# Patient Record
Sex: FEMALE | Race: AMERICAN INDIAN OR ALASKA NATIVE | NOT HISPANIC OR LATINO | Employment: UNEMPLOYED | URBAN - METROPOLITAN AREA
[De-identification: names, ages, dates, MRNs, and addresses within clinical notes are randomized per-mention and may not be internally consistent; named-entity substitution may affect disease eponyms.]

---

## 2021-05-24 ENCOUNTER — RECORDS - HEALTHEAST (OUTPATIENT)
Dept: ADMINISTRATIVE | Facility: CLINIC | Age: 41
End: 2021-05-24

## 2021-05-25 ENCOUNTER — RECORDS - HEALTHEAST (OUTPATIENT)
Dept: ADMINISTRATIVE | Facility: CLINIC | Age: 41
End: 2021-05-25

## 2022-04-03 ENCOUNTER — HOSPITAL ENCOUNTER (EMERGENCY)
Facility: HOSPITAL | Age: 42
Discharge: HOME | End: 2022-04-03
Attending: EMERGENCY MEDICINE | Admitting: EMERGENCY MEDICINE
Payer: COMMERCIAL

## 2022-04-03 VITALS
OXYGEN SATURATION: 100 % | SYSTOLIC BLOOD PRESSURE: 173 MMHG | DIASTOLIC BLOOD PRESSURE: 77 MMHG | TEMPERATURE: 97.7 F | WEIGHT: 135 LBS | HEART RATE: 70 BPM | RESPIRATION RATE: 14 BRPM

## 2022-04-03 DIAGNOSIS — K08.89 PAIN, DENTAL: ICD-10-CM

## 2022-04-03 PROCEDURE — 250N000013 HC RX MED GY IP 250 OP 250 PS 637: Performed by: EMERGENCY MEDICINE

## 2022-04-03 PROCEDURE — 99283 EMERGENCY DEPT VISIT LOW MDM: CPT

## 2022-04-03 RX ORDER — PENICILLIN V POTASSIUM 500 MG/1
500 TABLET, FILM COATED ORAL 4 TIMES DAILY
Qty: 40 TABLET | Refills: 0 | Status: SHIPPED | OUTPATIENT
Start: 2022-04-03 | End: 2022-04-13

## 2022-04-03 RX ORDER — PENICILLIN V POTASSIUM 250 MG/1
500 TABLET, FILM COATED ORAL ONCE
Status: COMPLETED | OUTPATIENT
Start: 2022-04-03 | End: 2022-04-03

## 2022-04-03 RX ORDER — IBUPROFEN 800 MG/1
800 TABLET, FILM COATED ORAL EVERY 8 HOURS PRN
Qty: 24 TABLET | Refills: 0 | Status: SHIPPED | OUTPATIENT
Start: 2022-04-03 | End: 2022-04-11

## 2022-04-03 RX ORDER — IBUPROFEN 600 MG/1
600 TABLET, FILM COATED ORAL ONCE
Status: COMPLETED | OUTPATIENT
Start: 2022-04-03 | End: 2022-04-03

## 2022-04-03 RX ADMIN — PENICILLIN V POTASSIUM 500 MG: 250 TABLET, FILM COATED ORAL at 05:58

## 2022-04-03 RX ADMIN — IBUPROFEN 600 MG: 600 TABLET ORAL at 05:58

## 2022-04-03 NOTE — ED TRIAGE NOTES
Patient presents here for evaluation of oral pain that has occurred over the past three weeks, following partial removal of a molar on the left lower row. She also notes that she has a pimple or a sore on her left cheek that she is picking at, which is very uncomfortable.

## 2022-04-03 NOTE — DISCHARGE INSTRUCTIONS
Our primary care medical scheduling team will call you to help you set up an appointment for a blood pressure recheck, because your blood pressure in the ER tonight was high and you should have it rechecked by our primary medical care team in their office.

## 2022-04-03 NOTE — ED PROVIDER NOTES
EMERGENCY DEPARTMENT ENCOUNTER      NAME: Jennifer Batres  AGE: 41 year old female  YOB: 1980  MRN: [id number]  EVALUATION DATE & TIME: 4/3/2022  4:45 AM    PCP: No primary care provider on file.    ED PROVIDER: Diane Guerra M.D.      Chief Complaint   Patient presents with     Dental Pain         FINAL IMPRESSION:  1. Pain, dental          ED COURSE & MEDICAL DECISION MAKING:    ED Course as of 04/03/22 0624   Sun Apr 03, 2022   0548 Pt with many older appearing dental caries without local abscess or trismus or facial swelling, normal VS and afebrile. Pt amenable to pen VK, ibuprofen and given dental close f/u and referral to PMD for BP recheck as BP was elevated in the ED today. Patient discharged after being provided with extensive anticipatory guidance and given return precautions, importance of PMD follow-up emphasized.        Pertinent Labs & Imaging studies reviewed. (See chart for details)    N95 worn  A face shield was worn also  COVID PPE    5:44 AM Introduced myself to the patient, obtained history of present illness, and performed initial physical exam at this time.   5:49 AM Discussed discharge at this time. The patient is agreeable with this plan.    At the conclusion of the encounter I discussed the results of all of the tests and the disposition. The questions were answered. The patient or family acknowledged understanding and was agreeable with the care plan.     MEDICATIONS GIVEN IN THE EMERGENCY:  Medications   ibuprofen (ADVIL/MOTRIN) tablet 600 mg (600 mg Oral Given 4/3/22 0558)   penicillin V (VEETID) tablet 500 mg (500 mg Oral Given 4/3/22 0558)       NEW PRESCRIPTIONS STARTED AT TODAY'S ER VISIT  Discharge Medication List as of 4/3/2022  5:59 AM      START taking these medications    Details   ibuprofen (ADVIL/MOTRIN) 800 MG tablet Take 1 tablet (800 mg) by mouth every 8 hours as needed for moderate pain, Disp-24 tablet, R-0, E-Prescribe      penicillin V (VEETID) 500 MG  tablet Take 1 tablet (500 mg) by mouth 4 times daily for 10 days, Disp-40 tablet, R-0, E-Prescribe                =================================================================    HPI    Jennifer Batres is a 41 year old female who presents to the ED today via private vehicle with dental pain.     About 6 months ago, the patient had a broken tooth and was able to remove half of it. This morning, her whole jaw has been bothering her, as it feels like there is a lot of pressure on her jaw. She rates the pain 7/10. She endorses chills, but denies fever. She has not taken any medication yet today.     REVIEW OF SYSTEMS   All other systems reviewed and are negative except as noted above in HPI.    PAST MEDICAL HISTORY:  No past medical history on file.    PAST SURGICAL HISTORY:  No past surgical history on file.    CURRENT MEDICATIONS:    ibuprofen (ADVIL/MOTRIN) 800 MG tablet  penicillin V (VEETID) 500 MG tablet        ALLERGIES:  No Known Allergies    FAMILY HISTORY:  No family history on file.    SOCIAL HISTORY:   Social History     Socioeconomic History     Marital status: Single     Spouse name: Not on file     Number of children: Not on file     Years of education: Not on file     Highest education level: Not on file   Occupational History     Not on file   Tobacco Use     Smoking status: Not on file     Smokeless tobacco: Not on file   Substance and Sexual Activity     Alcohol use: Not on file     Drug use: Not on file     Sexual activity: Not on file   Other Topics Concern     Not on file   Social History Narrative     Not on file     Social Determinants of Health     Financial Resource Strain: Not on file   Food Insecurity: Not on file   Transportation Needs: Not on file   Physical Activity: Not on file   Stress: Not on file   Social Connections: Not on file   Intimate Partner Violence: Not on file   Housing Stability: Not on file       VITALS:  Patient Vitals for the past 24 hrs:   BP Temp Temp src Pulse  Resp SpO2 Weight   04/03/22 0440 (!) 173/77 97.7  F (36.5  C) Oral 70 14 100 % 61.2 kg (135 lb)       PHYSICAL EXAM    GENERAL: Awake, alert.  In no acute distress.   HEENT: Normocephalic, atraumatic.  Pupils equal, round and reactive.  Conjunctiva normal.  EOMI. Left mandibular molar x2 partially missing without trismus, abscess, or local inflammation.  NECK: No stridor or apparent deformity.  EXTREMITIES: No lower extremity swelling or edema.    NEURO: Alert and oriented to person, place and time.  Cranial nerves grossly intact.  No focal motor deficit.  PSYCH: Normal mood and affect  SKIN: No rashes            I, Aysha Zuh, am serving as a scribe to document services personally performed by Dr. Diane Guerra based on my observation and the provider's statements to me. IDiane MD attest that Aysha Zhu is acting in a scribe capacity, has observed my performance of the services and has documented them in accordance with my direction.     Diane Guerra MD  04/03/22 0651

## 2023-08-28 ENCOUNTER — HOSPITAL ENCOUNTER (OUTPATIENT)
Facility: CLINIC | Age: 43
Setting detail: OBSERVATION
Discharge: HOME | End: 2023-08-29
Attending: EMERGENCY MEDICINE | Admitting: EMERGENCY MEDICINE
Payer: COMMERCIAL

## 2023-08-28 DIAGNOSIS — R11.2 NAUSEA AND VOMITING, UNSPECIFIED VOMITING TYPE: ICD-10-CM

## 2023-08-28 DIAGNOSIS — N12 PYELONEPHRITIS: ICD-10-CM

## 2023-08-28 PROCEDURE — 96365 THER/PROPH/DIAG IV INF INIT: CPT | Mod: 59

## 2023-08-28 PROCEDURE — 96361 HYDRATE IV INFUSION ADD-ON: CPT

## 2023-08-28 PROCEDURE — 99285 EMERGENCY DEPT VISIT HI MDM: CPT | Mod: 25

## 2023-08-28 RX ORDER — ONDANSETRON 2 MG/ML
4 INJECTION INTRAMUSCULAR; INTRAVENOUS ONCE
Status: COMPLETED | OUTPATIENT
Start: 2023-08-29 | End: 2023-08-29

## 2023-08-28 RX ORDER — KETOROLAC TROMETHAMINE 15 MG/ML
15 INJECTION, SOLUTION INTRAMUSCULAR; INTRAVENOUS ONCE
Status: COMPLETED | OUTPATIENT
Start: 2023-08-29 | End: 2023-08-29

## 2023-08-29 ENCOUNTER — APPOINTMENT (OUTPATIENT)
Dept: CT IMAGING | Facility: CLINIC | Age: 43
End: 2023-08-29
Attending: EMERGENCY MEDICINE
Payer: COMMERCIAL

## 2023-08-29 VITALS
OXYGEN SATURATION: 100 % | DIASTOLIC BLOOD PRESSURE: 95 MMHG | BODY MASS INDEX: 19.26 KG/M2 | TEMPERATURE: 98.8 F | HEART RATE: 61 BPM | WEIGHT: 130 LBS | SYSTOLIC BLOOD PRESSURE: 157 MMHG | HEIGHT: 69 IN | RESPIRATION RATE: 16 BRPM

## 2023-08-29 PROBLEM — N12 PYELONEPHRITIS: Status: ACTIVE | Noted: 2023-08-29

## 2023-08-29 PROBLEM — F19.10 POLYSUBSTANCE ABUSE (H): Status: ACTIVE | Noted: 2023-08-29

## 2023-08-29 PROBLEM — F15.20 METHAMPHETAMINE USE DISORDER, SEVERE, DEPENDENCE (H): Status: ACTIVE | Noted: 2021-11-01

## 2023-08-29 PROBLEM — F11.90 OPIOID USE DISORDER: Status: ACTIVE | Noted: 2023-08-29

## 2023-08-29 PROBLEM — F12.20 TETRAHYDROCANNABINOL (THC) USE DISORDER, SEVERE, DEPENDENCE (H): Status: ACTIVE | Noted: 2021-11-01

## 2023-08-29 PROBLEM — Z86.73 HISTORY OF CEREBROVASCULAR ACCIDENT (CVA) DUE TO ISCHEMIA: Status: ACTIVE | Noted: 2023-08-29

## 2023-08-29 PROBLEM — T40.2X1A OPIOID OVERDOSE (H): Status: ACTIVE | Noted: 2021-11-01

## 2023-08-29 PROBLEM — R11.2 NAUSEA AND VOMITING, UNSPECIFIED VOMITING TYPE: Status: ACTIVE | Noted: 2023-08-29

## 2023-08-29 PROBLEM — Z22.322 MRSA (METHICILLIN RESISTANT STAPHYLOCOCCUS AUREUS) CARRIER: Status: ACTIVE | Noted: 2023-08-29

## 2023-08-29 LAB
ALBUMIN SERPL-MCNC: 3.7 G/DL (ref 3.5–5)
ALBUMIN UR-MCNC: NEGATIVE MG/DL
ALP SERPL-CCNC: 74 U/L (ref 45–120)
ALT SERPL W P-5'-P-CCNC: 47 U/L (ref 0–45)
ANION GAP SERPL CALCULATED.3IONS-SCNC: 7 MMOL/L (ref 5–18)
APPEARANCE UR: CLEAR
AST SERPL W P-5'-P-CCNC: 60 U/L (ref 0–40)
BACTERIA #/AREA URNS HPF: ABNORMAL /HPF
BASOPHILS # BLD AUTO: 0 10E3/UL (ref 0–0.2)
BASOPHILS NFR BLD AUTO: 1 %
BILIRUB SERPL-MCNC: 0.9 MG/DL (ref 0–1)
BILIRUB UR QL STRIP: NEGATIVE
BUN SERPL-MCNC: 9 MG/DL (ref 8–22)
CALCIUM SERPL-MCNC: 8.7 MG/DL (ref 8.5–10.5)
CHLORIDE BLD-SCNC: 104 MMOL/L (ref 98–107)
CO2 SERPL-SCNC: 26 MMOL/L (ref 22–31)
COLOR UR AUTO: COLORLESS
CREAT SERPL-MCNC: 0.74 MG/DL (ref 0.6–1.1)
EOSINOPHIL # BLD AUTO: 0.2 10E3/UL (ref 0–0.7)
EOSINOPHIL NFR BLD AUTO: 3 %
ERYTHROCYTE [DISTWIDTH] IN BLOOD BY AUTOMATED COUNT: 17.9 % (ref 10–15)
GFR SERPL CREATININE-BSD FRML MDRD: >90 ML/MIN/1.73M2
GLUCOSE BLD-MCNC: 104 MG/DL (ref 70–125)
GLUCOSE UR STRIP-MCNC: NEGATIVE MG/DL
HCT VFR BLD AUTO: 30.9 % (ref 35–47)
HGB BLD-MCNC: 8.6 G/DL (ref 11.7–15.7)
HGB UR QL STRIP: NEGATIVE
IMM GRANULOCYTES # BLD: 0 10E3/UL
IMM GRANULOCYTES NFR BLD: 0 %
KETONES UR STRIP-MCNC: 10 MG/DL
LACTATE SERPL-SCNC: 0.7 MMOL/L (ref 0.7–2)
LEUKOCYTE ESTERASE UR QL STRIP: ABNORMAL
LYMPHOCYTES # BLD AUTO: 1.5 10E3/UL (ref 0.8–5.3)
LYMPHOCYTES NFR BLD AUTO: 22 %
MCH RBC QN AUTO: 18.3 PG (ref 26.5–33)
MCHC RBC AUTO-ENTMCNC: 27.8 G/DL (ref 31.5–36.5)
MCV RBC AUTO: 66 FL (ref 78–100)
MONOCYTES # BLD AUTO: 0.6 10E3/UL (ref 0–1.3)
MONOCYTES NFR BLD AUTO: 9 %
MRSA DNA SPEC QL NAA+PROBE: NEGATIVE
MUCOUS THREADS #/AREA URNS LPF: PRESENT /LPF
NEUTROPHILS # BLD AUTO: 4.3 10E3/UL (ref 1.6–8.3)
NEUTROPHILS NFR BLD AUTO: 65 %
NITRATE UR QL: POSITIVE
NRBC # BLD AUTO: 0 10E3/UL
NRBC BLD AUTO-RTO: 0 /100
PH UR STRIP: 7 [PH] (ref 5–7)
PLATELET # BLD AUTO: 351 10E3/UL (ref 150–450)
POTASSIUM BLD-SCNC: 4.1 MMOL/L (ref 3.5–5)
PROT SERPL-MCNC: 7.1 G/DL (ref 6–8)
RBC # BLD AUTO: 4.71 10E6/UL (ref 3.8–5.2)
RBC URINE: 1 /HPF
SA TARGET DNA: NEGATIVE
SODIUM SERPL-SCNC: 137 MMOL/L (ref 136–145)
SP GR UR STRIP: 1.01 (ref 1–1.03)
UROBILINOGEN UR STRIP-MCNC: <2 MG/DL
WBC # BLD AUTO: 6.6 10E3/UL (ref 4–11)
WBC URINE: 10 /HPF

## 2023-08-29 PROCEDURE — 250N000011 HC RX IP 250 OP 636: Performed by: EMERGENCY MEDICINE

## 2023-08-29 PROCEDURE — 96375 TX/PRO/DX INJ NEW DRUG ADDON: CPT

## 2023-08-29 PROCEDURE — 83605 ASSAY OF LACTIC ACID: CPT | Performed by: EMERGENCY MEDICINE

## 2023-08-29 PROCEDURE — 99223 1ST HOSP IP/OBS HIGH 75: CPT | Performed by: FAMILY MEDICINE

## 2023-08-29 PROCEDURE — 74177 CT ABD & PELVIS W/CONTRAST: CPT

## 2023-08-29 PROCEDURE — 96365 THER/PROPH/DIAG IV INF INIT: CPT | Mod: 59

## 2023-08-29 PROCEDURE — 81001 URINALYSIS AUTO W/SCOPE: CPT | Performed by: EMERGENCY MEDICINE

## 2023-08-29 PROCEDURE — 258N000003 HC RX IP 258 OP 636: Performed by: FAMILY MEDICINE

## 2023-08-29 PROCEDURE — 87086 URINE CULTURE/COLONY COUNT: CPT | Performed by: EMERGENCY MEDICINE

## 2023-08-29 PROCEDURE — 96361 HYDRATE IV INFUSION ADD-ON: CPT

## 2023-08-29 PROCEDURE — 87040 BLOOD CULTURE FOR BACTERIA: CPT | Performed by: EMERGENCY MEDICINE

## 2023-08-29 PROCEDURE — 250N000011 HC RX IP 250 OP 636: Mod: JZ | Performed by: EMERGENCY MEDICINE

## 2023-08-29 PROCEDURE — 80053 COMPREHEN METABOLIC PANEL: CPT | Performed by: EMERGENCY MEDICINE

## 2023-08-29 PROCEDURE — G0378 HOSPITAL OBSERVATION PER HR: HCPCS

## 2023-08-29 PROCEDURE — 85025 COMPLETE CBC W/AUTO DIFF WBC: CPT | Performed by: EMERGENCY MEDICINE

## 2023-08-29 PROCEDURE — 258N000003 HC RX IP 258 OP 636: Performed by: EMERGENCY MEDICINE

## 2023-08-29 PROCEDURE — 36415 COLL VENOUS BLD VENIPUNCTURE: CPT | Performed by: EMERGENCY MEDICINE

## 2023-08-29 PROCEDURE — 87641 MR-STAPH DNA AMP PROBE: CPT | Performed by: FAMILY MEDICINE

## 2023-08-29 RX ORDER — POLYETHYLENE GLYCOL 3350 17 G/17G
17 POWDER, FOR SOLUTION ORAL DAILY PRN
Status: DISCONTINUED | OUTPATIENT
Start: 2023-08-29 | End: 2023-08-29 | Stop reason: HOSPADM

## 2023-08-29 RX ORDER — CEFDINIR 300 MG/1
300 CAPSULE ORAL 2 TIMES DAILY
Qty: 28 CAPSULE | Refills: 0 | Status: SHIPPED | OUTPATIENT
Start: 2023-08-29 | End: 2023-09-12

## 2023-08-29 RX ORDER — SULFAMETHOXAZOLE/TRIMETHOPRIM 800-160 MG
2 TABLET ORAL 2 TIMES DAILY
COMMUNITY
Start: 2023-08-19 | End: 2023-08-29

## 2023-08-29 RX ORDER — SODIUM CHLORIDE 9 MG/ML
INJECTION, SOLUTION INTRAVENOUS CONTINUOUS
Status: DISCONTINUED | OUTPATIENT
Start: 2023-08-29 | End: 2023-08-29 | Stop reason: HOSPADM

## 2023-08-29 RX ORDER — CEFTRIAXONE 1 G/1
1 INJECTION, POWDER, FOR SOLUTION INTRAMUSCULAR; INTRAVENOUS ONCE
Status: COMPLETED | OUTPATIENT
Start: 2023-08-29 | End: 2023-08-29

## 2023-08-29 RX ORDER — ACETAMINOPHEN 325 MG/1
650 TABLET ORAL EVERY 6 HOURS PRN
Status: DISCONTINUED | OUTPATIENT
Start: 2023-08-29 | End: 2023-08-29 | Stop reason: HOSPADM

## 2023-08-29 RX ORDER — MORPHINE SULFATE 4 MG/ML
4 INJECTION, SOLUTION INTRAMUSCULAR; INTRAVENOUS ONCE
Status: COMPLETED | OUTPATIENT
Start: 2023-08-29 | End: 2023-08-29

## 2023-08-29 RX ORDER — AMOXICILLIN 250 MG
2 CAPSULE ORAL 2 TIMES DAILY PRN
Status: DISCONTINUED | OUTPATIENT
Start: 2023-08-29 | End: 2023-08-29 | Stop reason: HOSPADM

## 2023-08-29 RX ORDER — AMOXICILLIN 250 MG
1 CAPSULE ORAL 2 TIMES DAILY PRN
Status: DISCONTINUED | OUTPATIENT
Start: 2023-08-29 | End: 2023-08-29 | Stop reason: HOSPADM

## 2023-08-29 RX ORDER — ACETAMINOPHEN 650 MG/1
650 SUPPOSITORY RECTAL EVERY 6 HOURS PRN
Status: DISCONTINUED | OUTPATIENT
Start: 2023-08-29 | End: 2023-08-29 | Stop reason: HOSPADM

## 2023-08-29 RX ORDER — HYDROMORPHONE HYDROCHLORIDE 1 MG/ML
0.2 INJECTION, SOLUTION INTRAMUSCULAR; INTRAVENOUS; SUBCUTANEOUS
Status: DISCONTINUED | OUTPATIENT
Start: 2023-08-29 | End: 2023-08-29 | Stop reason: HOSPADM

## 2023-08-29 RX ORDER — CEFTRIAXONE 1 G/1
1 INJECTION, POWDER, FOR SOLUTION INTRAMUSCULAR; INTRAVENOUS EVERY 24 HOURS
Status: DISCONTINUED | OUTPATIENT
Start: 2023-08-30 | End: 2023-08-29 | Stop reason: HOSPADM

## 2023-08-29 RX ORDER — ONDANSETRON 4 MG/1
4 TABLET, ORALLY DISINTEGRATING ORAL EVERY 6 HOURS PRN
Status: DISCONTINUED | OUTPATIENT
Start: 2023-08-29 | End: 2023-08-29 | Stop reason: HOSPADM

## 2023-08-29 RX ORDER — ONDANSETRON 2 MG/ML
4 INJECTION INTRAMUSCULAR; INTRAVENOUS EVERY 6 HOURS PRN
Status: DISCONTINUED | OUTPATIENT
Start: 2023-08-29 | End: 2023-08-29 | Stop reason: HOSPADM

## 2023-08-29 RX ORDER — IOPAMIDOL 755 MG/ML
100 INJECTION, SOLUTION INTRAVASCULAR ONCE
Status: COMPLETED | OUTPATIENT
Start: 2023-08-29 | End: 2023-08-29

## 2023-08-29 RX ORDER — SODIUM CHLORIDE 9 MG/ML
INJECTION, SOLUTION INTRAVENOUS ONCE
Status: COMPLETED | OUTPATIENT
Start: 2023-08-29 | End: 2023-08-29

## 2023-08-29 RX ADMIN — SODIUM CHLORIDE: 9 INJECTION, SOLUTION INTRAVENOUS at 03:21

## 2023-08-29 RX ADMIN — CEFTRIAXONE 1 G: 1 INJECTION, POWDER, FOR SOLUTION INTRAMUSCULAR; INTRAVENOUS at 01:50

## 2023-08-29 RX ADMIN — SODIUM CHLORIDE: 9 INJECTION, SOLUTION INTRAVENOUS at 02:56

## 2023-08-29 RX ADMIN — IOPAMIDOL 100 ML: 755 INJECTION, SOLUTION INTRAVENOUS at 01:06

## 2023-08-29 RX ADMIN — KETOROLAC TROMETHAMINE 15 MG: 15 INJECTION, SOLUTION INTRAMUSCULAR; INTRAVENOUS at 00:41

## 2023-08-29 RX ADMIN — ONDANSETRON 4 MG: 2 INJECTION INTRAMUSCULAR; INTRAVENOUS at 00:41

## 2023-08-29 RX ADMIN — MORPHINE SULFATE 4 MG: 4 INJECTION, SOLUTION INTRAMUSCULAR; INTRAVENOUS at 00:41

## 2023-08-29 RX ADMIN — SODIUM CHLORIDE 1000 ML: 9 INJECTION, SOLUTION INTRAVENOUS at 00:40

## 2023-08-29 ASSESSMENT — ENCOUNTER SYMPTOMS
VOMITING: 1
FEVER: 1
NAUSEA: 1
FLANK PAIN: 1

## 2023-08-29 ASSESSMENT — ACTIVITIES OF DAILY LIVING (ADL)
ADLS_ACUITY_SCORE: 38
ADLS_ACUITY_SCORE: 35
ADLS_ACUITY_SCORE: 38
ADLS_ACUITY_SCORE: 38
ADLS_ACUITY_SCORE: 35
ADLS_ACUITY_SCORE: 38
ADLS_ACUITY_SCORE: 38

## 2023-08-29 NOTE — ED TRIAGE NOTES
Left flank pain, low grade fever and vomiting continue. Diagnosed with kidney infection at [facility], pt on day 5 of antibiotics and not doing any better.     Triage Assessment       Row Name 08/28/23 1862       Triage Assessment (Adult)    Airway WDL WDL       Respiratory WDL    Respiratory WDL WDL       Skin Circulation/Temperature WDL    Skin Circulation/Temperature WDL WDL       Cardiac WDL    Cardiac WDL WDL       Peripheral/Neurovascular WDL    Peripheral Neurovascular WDL WDL       Cognitive/Neuro/Behavioral WDL    Cognitive/Neuro/Behavioral WDL WDL

## 2023-08-29 NOTE — ED PROVIDER NOTES
EMERGENCY DEPARTMENT ENCOUNTER      NAME: Jennifer Daniels  AGE: 42 year old female  YOB: 1980  MRN: [id number]  EVALUATION DATE & TIME: No admission date for patient encounter.    PCP: Dunia Mancuso    ED PROVIDER: Bert Ocampo MD    Chief Complaint   Patient presents with    Flank Pain    Vomiting    Fever     FINAL IMPRESSION:  1. Pyelonephritis    2. Nausea and vomiting, unspecified vomiting type      ED COURSE & MEDICAL DECISION MAKIN:36 PM I met with the patient for an interview and initial exam. Plans for lab and imaging workup in the ED were discussed.   2:34 AM I Spoke with Dr. Pereyra, Hospitalist. We further discussed the patient's case and reviewed ED work-up so far. He agrees to admit the patient.    Pertinent Labs & Imaging studies reviewed. (See chart for details)  42 year old female presents to the Emergency Department for evaluation of flank pain, increasing malaise and nausea vomiting inability to take oral intake.  Patient has a past medical history significant for recurrent obstructing ureteral stones as well as recurrent pyelonephritis.  Patient was seen approximately a week ago at [facility] for onset of flank pain with concern for pyelonephritis.  Urinalysis was consistent with acute pyelonephritis.  She was discharged culture pending on Bactrim antibiotic.  Unfortunately the patient's urine culture grew out E. coli resistant to Bactrim.  Patient antibiotic has not been changed.  She presents to the emergency department today with with escalating flank pain inability to take oral intake.  She reports having previous history of sepsis secondary to pyelonephritis although per her report its been a number of years.  On examination patient appeared to be fatigued.  She would awaken to verbal stimulation and answer questions appropriately.  She reported severe left flank pain.  Reported nausea.  Vital signs overall reassuring.  Left flank pain to palpation no  guarding no peritoneal signs.  IV established.  I did obtain blood cultures secondary to patient's reported history of sepsis and what appears to be several day history of pyelonephritis not currently on the appropriate antibiotic given its resistance pattern.  Fortunately patient's laboratory testing overall was reassuring.  She continued to have issues with nausea here in the emergency department concerned that the patient could take her oral intake and ultimately with return of her CT results which did demonstrate pyelonephritis our plan of care at this point is for mission to the hospital until patient's nausea is improved and she can tolerate oral intake.  Admitting services paged.    2:37 AM  Discussed case with Dr. Pereyra admitting hospitalist service observation admission.    At the conclusion of the encounter I discussed the results of all of the tests and the disposition. The questions were answered. The patient or family acknowledged understanding and was agreeable with the care plan.     Medical Decision Making    History:  Supplemental history from: Documented in chart, if applicable  External Record(s) reviewed: Documented in chart, if applicable. and Outpatient Record: [facility] ED on 08/19/2023    Work Up:  Chart documentation includes differential considered and any EKGs or imaging independently interpreted by provider, where specified.  In additional to work up documented, I considered the following work up: Documented in chart, if applicable.    External consultation:  Discussion of management with another provider: Documented in chart, if applicable    Complicating factors:  Care impacted by chronic illness: Other: Recurrent pyelonephritis, antibiotic resistant infection  Care affected by social determinants of health: Other:      Disposition considerations: Admit.        MEDICATIONS GIVEN IN THE EMERGENCY:  Medications   cefTRIAXone (ROCEPHIN) 1 g vial to attach to  mL bag for  ADULTS or NS 50 mL bag for PEDS (1 g Intravenous $New Bag 8/29/23 0150)   sodium chloride 0.9% infusion (has no administration in time range)   0.9% sodium chloride BOLUS ( Intravenous Restarted 8/29/23 0150)   ketorolac (TORADOL) injection 15 mg (15 mg Intravenous $Given 8/29/23 0041)   ondansetron (ZOFRAN) injection 4 mg (4 mg Intravenous $Given 8/29/23 0041)   morphine (PF) injection 4 mg (4 mg Intravenous $Given 8/29/23 0041)   iopamidol (ISOVUE-370) solution 100 mL (100 mLs Intravenous $Given 8/29/23 0106)       NEW PRESCRIPTIONS STARTED AT TODAY'S ER VISIT  New Prescriptions    No medications on file          =================================================================    HPI    Patient information was obtained from: The patient and her mother    Use of : N/A       Jennifer Daniels is a 42 year old female with no pertinent history who presents to this ED via private vehicle for evaluation of flank pain.    The patient is complaining of worsening left-sided flank pain for over a month now. She was seen at [facility] recently for her symptoms and was placed on Bactrim. They did not receive a follow-up call regarding the urine culture findings. The patient is complaining of fevers, nausea and vomiting with her symptoms. They are concerned if she is septic.    Otherwise, the patient denied having any other medical complaints or concerns at this time.     Per chart review, the patient was seen at [facility] ED on 08/19/2023 for left-sided flank pain and left lower jaw pain for over 6 weeks. UA revealed a small amount of leukocyte and an elevated WBC count at 14. Urine culture on 08/21/23 had growth for E.coli and was found resistant to bactrim.      REVIEW OF SYSTEMS   Review of Systems   Constitutional:  Positive for fever.   Gastrointestinal:  Positive for nausea and vomiting.   Genitourinary:  Positive for flank pain (left).   All other systems reviewed and are negative.       PAST  "MEDICAL HISTORY:  Recurrent pyelonephritis  Obstructing kidney stone    CURRENT MEDICATIONS:    No current outpatient medications on file.      ALLERGIES:  No Known Allergies    FAMILY HISTORY:  No family history on file.    SOCIAL HISTORY:   Social History     Socioeconomic History    Marital status: Single       VITALS:  BP (!) 140/75   Pulse 55   Temp 98.2  F (36.8  C) (Temporal)   Resp 16   Ht 1.753 m (5' 9\")   Wt 59 kg (130 lb)   LMP 08/14/2023   SpO2 99%   BMI 19.20 kg/m      PHYSICAL EXAM    Constitutional: Somnolent, awakens to verbal stimulation.  HENT: Normocephalic, Atraumatic, Bilateral external ears normal, Oropharynx normal, mucous membranes moist, Nose normal. Neck-  Normal range of motion, No tenderness, Supple, No stridor.   Eyes: PERRL, EOMI, Conjunctiva normal, No discharge.   Respiratory: Normal breath sounds, No respiratory distress, No wheezing, Speaks full sentences easily. No cough.   Cardiovascular: Normal heart rate, Regular rhythm, No murmurs Chest wall nontender.    GI: Left abdominal pain and left flank tenderness  : No cva tenderness    Musculoskeletal: 2+ DP pulses. No edema. No cyanosis. Good range of motion in all major joints. No tenderness to palpation. No tenderness of the CTLS spine.   Integument: Warm, Dry, No erythema, No rash. No petechiae.   Neurologic: Patient is somnolent but awakens easily normal motor function, Normal sensory function, No focal deficits noted.   Psychiatric: Flat affect        LAB:  All pertinent labs reviewed and interpreted.  Results for orders placed or performed during the hospital encounter of 08/28/23   CT Abdomen Pelvis w Contrast    Impression    IMPRESSION:   1.  Multifocal mild renal cortical thinning/scarring of the right kidney compatible with sequela of prior infection.    2.  Minimal hyperenhancement in the right lower pole kidney with mild urothelial enhancement of the right ureter suspicious for mild pyelonephritis. Recommend " correlation with urinalysis.   Comprehensive metabolic panel   Result Value Ref Range    Sodium 137 136 - 145 mmol/L    Potassium 4.1 3.5 - 5.0 mmol/L    Chloride 104 98 - 107 mmol/L    Carbon Dioxide (CO2) 26 22 - 31 mmol/L    Anion Gap 7 5 - 18 mmol/L    Urea Nitrogen 9 8 - 22 mg/dL    Creatinine 0.74 0.60 - 1.10 mg/dL    Calcium 8.7 8.5 - 10.5 mg/dL    Glucose 104 70 - 125 mg/dL    Alkaline Phosphatase 74 45 - 120 U/L    AST 60 (H) 0 - 40 U/L    ALT 47 (H) 0 - 45 U/L    Protein Total 7.1 6.0 - 8.0 g/dL    Albumin 3.7 3.5 - 5.0 g/dL    Bilirubin Total 0.9 0.0 - 1.0 mg/dL    GFR Estimate >90 >60 mL/min/1.73m2   Lactic acid whole blood   Result Value Ref Range    Lactic Acid 0.7 0.7 - 2.0 mmol/L   CBC with platelets and differential   Result Value Ref Range    WBC Count 6.6 4.0 - 11.0 10e3/uL    RBC Count 4.71 3.80 - 5.20 10e6/uL    Hemoglobin 8.6 (L) 11.7 - 15.7 g/dL    Hematocrit 30.9 (L) 35.0 - 47.0 %    MCV 66 (L) 78 - 100 fL    MCH 18.3 (L) 26.5 - 33.0 pg    MCHC 27.8 (L) 31.5 - 36.5 g/dL    RDW 17.9 (H) 10.0 - 15.0 %    Platelet Count 351 150 - 450 10e3/uL    % Neutrophils 65 %    % Lymphocytes 22 %    % Monocytes 9 %    % Eosinophils 3 %    % Basophils 1 %    % Immature Granulocytes 0 %    NRBCs per 100 WBC 0 <1 /100    Absolute Neutrophils 4.3 1.6 - 8.3 10e3/uL    Absolute Lymphocytes 1.5 0.8 - 5.3 10e3/uL    Absolute Monocytes 0.6 0.0 - 1.3 10e3/uL    Absolute Eosinophils 0.2 0.0 - 0.7 10e3/uL    Absolute Basophils 0.0 0.0 - 0.2 10e3/uL    Absolute Immature Granulocytes 0.0 <=0.4 10e3/uL    Absolute NRBCs 0.0 10e3/uL       RADIOLOGY:  Reviewed all pertinent imaging. Please see official radiology report.  CT Abdomen Pelvis w Contrast   Final Result   IMPRESSION:    1.  Multifocal mild renal cortical thinning/scarring of the right kidney compatible with sequela of prior infection.      2.  Minimal hyperenhancement in the right lower pole kidney with mild urothelial enhancement of the right ureter suspicious  for mild pyelonephritis. Recommend correlation with urinalysis.          I have independently reviewed and interpreted the EKG(s) documented above.      I, Bert Ocampo MD, am serving as a scribe to document services personally performed by Bert Ocampo based on my observation and the provider's statements to me. I, Leonardo Ocampo attest that Bert Ocampo MD is acting in a scribe capacity, has observed my performance of the services and has documented them in accordance with my direction.    Bert Ocampo MD  [facility] EMERGENCY ROOM  [address] [id number] [phone]       Bert Ocampo MD  08/29/23 0231

## 2023-08-29 NOTE — DISCHARGE SUMMARY
[facility]  Hospitalist Discharge Summary      Date of Admission:  8/28/2023  Date of Discharge:  Patient Left Against Medical Advice because of a 'family emergency'   Discharging Provider: Fabian Hopkins MD  Discharge Service: Hospitalist Service    Discharge Diagnoses   Acute pyelonephritis  Nausea vomiting  Nephrolithiasis nonobstructive  Cannabis use dependency  History of polysubstance abuse  History of MRSA carrier status   History of ischemic CVA x6  History of PFO repair  History of WPW  Leaving Hospitalization AMA, per report 2/2 a reported family emergency     Clinically Significant Risk Factors          Follow-ups Needed After Discharge        Unresulted Labs Ordered in the Past 30 Days of this Admission       Date and Time Order Name Status Description    8/29/2023  3:12 AM Urine Culture In process     8/28/2023 11:34 PM Blood Culture Line, venous In process     8/28/2023 11:34 PM Blood Culture Peripheral Blood In process         These results will be followed up by whs. Pending cultures and patient left AMA; prescribed cefdinir given she tolerated ceftriaxone overnight    Discharge Disposition   Patient left Against Medical Advice  Condition at discharge: Guarded    Hospital Course   Summary:   Jennifer Daniels is a 42 year old female with PMH signficant for UTIs, ischemic CVA with memory issues, repaired PFO, WPW, polysubstance use disorder, MRSA carrier.  8/19 at [facility] ED diagnosed with a UTI.  Given ceftriaxone x1 and discharged on Bactrim.  8/29 evaluated at [facility] ED with complaints of left flank pain and worsening fever despite taking her antibiotics.  Reviewing previous urine culture shows resistance to Bactrim.  Blood work relatively unremarkable.  CT imaging shows right-sided changes consistent with mild pyelo and nonobstructive stones.  Given intravenous ceftriaxone and admitted.    Stable on rounds. Urine culture still pending. There were no new issues.     In  the mid afternoon, the patient notified the nurse that there was a family emergency that evolved and she was going to leave [facility].  The patient has decided to leave AGAINST MEDICAL ADVICE.  The patient has a normal mental status examination on rounds; and she understands condition and the risks of leaving are worsening infection, later finding out urine culture with resistance to antibiotics-- and she was informed that there is no urine culture data as it is still in process; also prior cultures showed bactrim resistance; other complications include permanent disability and/or even ultimately death.  The patient has had an opportunity to ask questions about their medical condition- she talked only with the bedside RN. Provider was notified patient was on her way out and messaged RN via [employer] that writer will send a prescription at the very least; pt notified of this.  The patient has been informed that they may return for care at any time, and has been referred to their primary care provider ASAP, or any medical facility such as ER/ED, urgent care, as soon as possible. The patient was able to express understanding including with repeat-back method including stating known risks of progression of condition and even resulting in death.    Given that the patient tolerated ceftriaxone overnight, will prescribe cefdinir twice daily for 14 days and the patient is to see her primary care provider as soon as possible and she was instructed to return to the ER for evaluation if she develops any fevers or chills or chest pain or nausea.     Patient left against medical advice. As there is no urine culture data available, writer ordered Cefdinir 300mg BID x14 days for antibiotics medication as the next best option given she tolerated ceftriaxone while hospitalized. Again instructed to please follow up with primary care provider ASAP and when to return to the ER.      Consultations This Hospital Stay   None    Code Status    Full Code    Time Spent on this Encounter    N/a    Fabian Hopkins MD  [facility] EMERGENCY ROOM  [address] [address]  Phone: [phone]  Fax: [phone]  ______________________________________________________________________    Physical Exam   Vital Signs: Temp: 98.8  F (37.1  C) Temp src: Oral BP: (!) 157/95 Pulse: 61   Resp: 16 SpO2: 100 % O2 Device: None (Room air)    Weight: 130 lbs 0 oz  See my progress note from earlier        Primary Care Physician   Dunia Mancuso    Discharge Orders   No discharge procedures on file.    Significant Results and Procedures   Results for orders placed or performed during the hospital encounter of 08/28/23   CT Abdomen Pelvis w Contrast    Narrative    EXAM: CT ABDOMEN PELVIS W CONTRAST  LOCATION: [facility]  DATE: 8/29/2023    INDICATION: pyelonephritis  COMPARISON: None.  TECHNIQUE: CT scan of the abdomen and pelvis was performed following injection of IV contrast. Multiplanar reformats were obtained. Dose reduction techniques were used.  CONTRAST: 100 ml Isovue 370    FINDINGS:   LOWER CHEST: Mild bibasilar atelectasis. No consolidation or pleural effusion.    HEPATOBILIARY: Mild hepatic steatosis. Liver and gallbladder otherwise normal. A 5 mm simple cyst is seen in the left hepatic lobe.    PANCREAS: Normal.    SPLEEN: Normal.    ADRENAL GLANDS: Normal.    KIDNEYS/BLADDER: Multifocal mild renal cortical thinning/scarring is seen in the right kidney. There is subtle cortical hypoenhancement in the most inferior pole of the right kidney and mild urothelial enhancement of the right ureter.    BOWEL: Normal.    LYMPH NODES: Normal.    VASCULATURE: Unremarkable.    PELVIC ORGANS: A heterogeneous enhancing lesion is seen in the right adnexal region measuring 2.5 x 2.2 cm, most compatible with a fibroid. A corpus luteal cyst is present in the left kidney.    MUSCULOSKELETAL: Normal.       Impression    IMPRESSION:   1.  Multifocal mild renal cortical thinning/scarring of the right kidney compatible with sequela of prior infection.    2.  Minimal hyperenhancement in the right lower pole kidney with mild urothelial enhancement of the right ureter suspicious for mild pyelonephritis. Recommend correlation with urinalysis.       Discharge Medications   Current Discharge Medication List        START taking these medications    Details   cefdinir (OMNICEF) 300 MG capsule Take 1 capsule (300 mg) by mouth 2 times daily for 14 days  Qty: 28 capsule, Refills: 0    Comments: Patient left against medical advice, so there is no urine culture data available, so ordering this medication as the next best option given she tolerated ceftriaxone while hospitalized. Follow up with PCP.  Associated Diagnoses: Pyelonephritis           CONTINUE these medications which have NOT CHANGED    Details   sulfamethoxazole-trimethoprim (BACTRIM DS) 800-160 MG tablet Take 2 tablets by mouth 2 times daily           Allergies   No Known Allergies

## 2023-08-29 NOTE — H&P
[facility] MEDICINE ADMISSION HISTORY AND PHYSICAL   Date of Admission: 8/29/2023  Jennifer Daniels, 1980, [id number]    Identification/Summary:   Jennifer Daniels is a 42 year old female with PMH signficant for UTIs, ischemic CVA with memory issues, repaired PFO, WPW, polysubstance use disorder, MRSA carrier.  8/19 at [facility] ED diagnosed with a UTI.  Given ceftriaxone x1 and discharged on Bactrim.  8/29 evaluated at [facility] ED with complaints of left flank pain and worsening fever despite taking her antibiotics.  Reviewing previous urine culture shows resistance to Bactrim.  Blood work relatively unremarkable.  CT imaging shows right-sided changes consistent with mild pyelo and nonobstructive stones.  Given intravenous ceftriaxone and admitted.    Assessment and Plan:    Acute pyelonephritis  Nausea vomiting  Nephrolithiasis nonobstructive  Observation MedSurg admission.  Reviewing [facility] urine culture from 8/29/2023 shows E. coli with resistances to Bactrim.  Symptoms are primarily reported on the left side though CT imaging shows right-sided pyelo-.  Nevertheless we will treat based on clinical picture.  Blood and urine cultures obtained.  Discontinue her current Bactrim.  Continue intravenous ceftriaxone.  Run IV fluids.  Cannabis use dependency  History of polysubstance abuse  Admits to daily cannabis use.  July 2023 was at [facility] for opioid overdose.  Responded to Narcan.  Monitor pain medication utilization.  Follow on continuous pulse oximetry.  History of MRSA carrier status  Contact precautions.  Check MRSA swab at the nares.  History of ischemic CVA x6  History of PFO repair  History of WPW  Noted.  Patient notes poor memory.  Follow clinically.    Anticoagulation   Low Risk/Ambulatory with no VTE prophylaxis indicated    COVID19 PCR travel screened    FoleyNot present    Code Status: Full Code    Disposition: Observation     Clinically Significant Risk Factors  Present on Admission                                  Chief Complaint Left flank pain and fever     HISTORY     Jennifer Daniels is a 42 year old female with PMH of UTIs, ischemic CVA with memory issues, repaired PFO, WPW, polysubstance use disorder, MRSA carrier.  8/19 at [facility] ED diagnosed with a UTI.  Given ceftriaxone x1 and discharged on Bactrim.  8/29 evaluated at [facility] ED with complaints of left flank pain and worsening fever despite taking her antibiotics.  Reviewing previous urine culture shows resistance to Bactrim.  Blood work relatively unremarkable.  CT imaging shows right-sided changes consistent with mild pyelo and nonobstructive stones.  Given intravenous ceftriaxone and admitted.  Patient estimates that she has had 6 ischemic strokes.  Last one was in 2010.  Has issues with her memory but no definitive muscular weakness.  Admits to daily cannabis usage.  History of smoking methamphetamine and opioids but denies any recent use.  However July 2023 was at [facility] the ED for presumed opioid overdose.  Responded to Narcan.  Feeling better since time of admission.  Denies personal history of heart attack, cancer, diabetes, DVT, PE, peptic ulcer disease or sleep apnea.  .  Questions answered to verbalize satisfaction.    Past Medical History     No past medical history on file.     Patient Active Problem List    Diagnosis Date Noted    Pyelonephritis 08/29/2023     Priority: Medium    Nausea and vomiting, unspecified vomiting type 08/29/2023     Priority: Medium    MRSA (methicillin resistant Staphylococcus aureus) carrier 08/29/2023     Priority: Medium    History of cerebrovascular accident (CVA) due to ischemia 08/29/2023     Priority: Medium    Polysubstance abuse (H) 08/29/2023     Priority: Medium    Opioid use disorder 08/29/2023     Priority: Medium    Methamphetamine use disorder, severe, dependence (H) 11/01/2021     Priority: Medium    Opioid overdose (H) 11/01/2021     Priority: Medium     Tetrahydrocannabinol (THC) use disorder, severe, dependence (H) 11/01/2021     Priority: Medium    Urolithiasis 01/07/2011     Priority: Medium    Patent foramen ovale 01/02/2011     Priority: Medium    Ischemic cerebrovascular accident (CVA) (H) 01/01/2011     Priority: Medium    Torsten-Parkinson-White (WPW) pattern 12/31/2010     Priority: Medium    Migraine headache 08/15/2010     Priority: Medium     Surgical History     Past Surgical History:   Procedure Laterality Date    Nephrolithiasis      REPAIR PATENT FORAMEN OVALE      TUBAL LIGATION       Family History      Family History   Problem Relation Age of Onset    Asthma Sister     Asthma Brother       Social History      Social History     Tobacco Use    Smoking status: Never   Substance Use Topics    Alcohol use: Not Currently    Drug use: Yes     Frequency: 7.0 times per week     Types: Marijuana      Allergies   No Known Allergies  Prior to Admission Medications      None      Review of Systems     A 12 point comprehensive review of systems was negative except as noted above in HPI.    PHYSICAL EXAMINATION     Vitals      Temp:  [98.2  F (36.8  C)-98.4  F (36.9  C)] 98.2  F (36.8  C)  Pulse:  [55-64] 56  Resp:  [16] 16  BP: (119-156)/(75-91) 143/75  SpO2:  [99 %-100 %] 100 %    Examination     Constitutional: fatigued, alert, cooperative, no apparent distress, appears older than stated age, and normal weight  Eyes: Lids and lashes normal, pupils equal, round and reactive to light, extra ocular muscles intact, sclera clear, conjunctiva normal  ENT: Normocephalic, without obvious abnormality, atraumatic, sinuses nontender on palpation, external ears without lesions, oral pharynx with moist mucous membranes, tonsils without erythema or exudates, gums normal and good dentition.  Hematologic / Lymphatic: no cervical lymphadenopathy and no supraclavicular lymphadenopathy  Respiratory: No increased work of breathing, good air exchange, clear to auscultation  bilaterally, no crackles or wheezing  Cardiovascular: Normal apical impulse, regular rate and rhythm, normal S1 and S2, no S3 or S4, and no murmur noted  GI: No scars, normal bowel sounds, soft, non-distended, non-tender, no masses palpated, no hepatosplenomegally  Skin: normal skin color, texture, turgor, no redness, warmth, or swelling, and no rashes  Musculoskeletal: There is no redness, warmth, or swelling of the joints.  Full range of motion noted.  Motor strength is 5 out of 5 all extremities bilaterally.  Tone is normal. no lower extremity pitting edema present  Neurologic: Cranial nerves II-XII are grossly intact. Sensory:  Sensory intact Deep Tendon Reflexes:  Reflexes are intact and symmetrical bilaterally  Neuropsychiatric: General: normal, calm, and normal eye contact Level of consciousness: alert / normal Affect: normal Orientation: oriented to self, place, time and situation Memory and insight: normal, memory for past and recent events intact, and thought process normal  Back: Does have some left-sided tenderness to palpation    Pertinent Lab     Results for orders placed or performed during the hospital encounter of 08/28/23 (from the past 24 hour(s))   CBC with platelets differential    Narrative    The following orders were created for panel order CBC with platelets differential.  Procedure                               Abnormality         Status                     ---------                               -----------         ------                     CBC with platelets and d...[[id number]]  Abnormal            Final result                 Please view results for these tests on the individual orders.   Comprehensive metabolic panel   Result Value Ref Range    Sodium 137 136 - 145 mmol/L    Potassium 4.1 3.5 - 5.0 mmol/L    Chloride 104 98 - 107 mmol/L    Carbon Dioxide (CO2) 26 22 - 31 mmol/L    Anion Gap 7 5 - 18 mmol/L    Urea Nitrogen 9 8 - 22 mg/dL    Creatinine 0.74 0.60 - 1.10 mg/dL    Calcium  8.7 8.5 - 10.5 mg/dL    Glucose 104 70 - 125 mg/dL    Alkaline Phosphatase 74 45 - 120 U/L    AST 60 (H) 0 - 40 U/L    ALT 47 (H) 0 - 45 U/L    Protein Total 7.1 6.0 - 8.0 g/dL    Albumin 3.7 3.5 - 5.0 g/dL    Bilirubin Total 0.9 0.0 - 1.0 mg/dL    GFR Estimate >90 >60 mL/min/1.73m2   Lactic acid whole blood   Result Value Ref Range    Lactic Acid 0.7 0.7 - 2.0 mmol/L   CBC with platelets and differential   Result Value Ref Range    WBC Count 6.6 4.0 - 11.0 10e3/uL    RBC Count 4.71 3.80 - 5.20 10e6/uL    Hemoglobin 8.6 (L) 11.7 - 15.7 g/dL    Hematocrit 30.9 (L) 35.0 - 47.0 %    MCV 66 (L) 78 - 100 fL    MCH 18.3 (L) 26.5 - 33.0 pg    MCHC 27.8 (L) 31.5 - 36.5 g/dL    RDW 17.9 (H) 10.0 - 15.0 %    Platelet Count 351 150 - 450 10e3/uL    % Neutrophils 65 %    % Lymphocytes 22 %    % Monocytes 9 %    % Eosinophils 3 %    % Basophils 1 %    % Immature Granulocytes 0 %    NRBCs per 100 WBC 0 <1 /100    Absolute Neutrophils 4.3 1.6 - 8.3 10e3/uL    Absolute Lymphocytes 1.5 0.8 - 5.3 10e3/uL    Absolute Monocytes 0.6 0.0 - 1.3 10e3/uL    Absolute Eosinophils 0.2 0.0 - 0.7 10e3/uL    Absolute Basophils 0.0 0.0 - 0.2 10e3/uL    Absolute Immature Granulocytes 0.0 <=0.4 10e3/uL    Absolute NRBCs 0.0 10e3/uL   CT Abdomen Pelvis w Contrast    Narrative    EXAM: CT ABDOMEN PELVIS W CONTRAST  LOCATION: [facility]  DATE: 8/29/2023    INDICATION: pyelonephritis  COMPARISON: None.  TECHNIQUE: CT scan of the abdomen and pelvis was performed following injection of IV contrast. Multiplanar reformats were obtained. Dose reduction techniques were used.  CONTRAST: 100 ml Isovue 370    FINDINGS:   LOWER CHEST: Mild bibasilar atelectasis. No consolidation or pleural effusion.    HEPATOBILIARY: Mild hepatic steatosis. Liver and gallbladder otherwise normal. A 5 mm simple cyst is seen in the left hepatic lobe.    PANCREAS: Normal.    SPLEEN: Normal.    ADRENAL GLANDS: Normal.    KIDNEYS/BLADDER: Multifocal mild  renal cortical thinning/scarring is seen in the right kidney. There is subtle cortical hypoenhancement in the most inferior pole of the right kidney and mild urothelial enhancement of the right ureter.    BOWEL: Normal.    LYMPH NODES: Normal.    VASCULATURE: Unremarkable.    PELVIC ORGANS: A heterogeneous enhancing lesion is seen in the right adnexal region measuring 2.5 x 2.2 cm, most compatible with a fibroid. A corpus luteal cyst is present in the left kidney.    MUSCULOSKELETAL: Normal.      Impression    IMPRESSION:   1.  Multifocal mild renal cortical thinning/scarring of the right kidney compatible with sequela of prior infection.    2.  Minimal hyperenhancement in the right lower pole kidney with mild urothelial enhancement of the right ureter suspicious for mild pyelonephritis. Recommend correlation with urinalysis.   UA with Microscopic reflex to Culture    Specimen: Urine, Midstream   Result Value Ref Range    Color Urine Colorless Colorless, Straw, Light Yellow, Yellow    Appearance Urine Clear Clear    Glucose Urine Negative Negative mg/dL    Bilirubin Urine Negative Negative    Ketones Urine 10 (A) Negative mg/dL    Specific Gravity Urine 1.010 1.001 - 1.030    Blood Urine Negative Negative    pH Urine 7.0 5.0 - 7.0    Protein Albumin Urine Negative Negative mg/dL    Urobilinogen Urine <2.0 <2.0 mg/dL    Nitrite Urine Positive (A) Negative    Leukocyte Esterase Urine 25 Yomaira/uL (A) Negative    Bacteria Urine Few (A) None Seen /HPF    Mucus Urine Present (A) None Seen /LPF    RBC Urine 1 <=2 /HPF    WBC Urine 10 (H) <=5 /HPF    Narrative    Urine Culture ordered based on laboratory criteria       Pertinent Radiology     Radiology Results:   Recent Results (from the past 24 hour(s))   CT Abdomen Pelvis w Contrast    Narrative    EXAM: CT ABDOMEN PELVIS W CONTRAST  LOCATION: [facility]  DATE: 8/29/2023    INDICATION: pyelonephritis  COMPARISON: None.  TECHNIQUE: CT scan of the  abdomen and pelvis was performed following injection of IV contrast. Multiplanar reformats were obtained. Dose reduction techniques were used.  CONTRAST: 100 ml Isovue 370    FINDINGS:   LOWER CHEST: Mild bibasilar atelectasis. No consolidation or pleural effusion.    HEPATOBILIARY: Mild hepatic steatosis. Liver and gallbladder otherwise normal. A 5 mm simple cyst is seen in the left hepatic lobe.    PANCREAS: Normal.    SPLEEN: Normal.    ADRENAL GLANDS: Normal.    KIDNEYS/BLADDER: Multifocal mild renal cortical thinning/scarring is seen in the right kidney. There is subtle cortical hypoenhancement in the most inferior pole of the right kidney and mild urothelial enhancement of the right ureter.    BOWEL: Normal.    LYMPH NODES: Normal.    VASCULATURE: Unremarkable.    PELVIC ORGANS: A heterogeneous enhancing lesion is seen in the right adnexal region measuring 2.5 x 2.2 cm, most compatible with a fibroid. A corpus luteal cyst is present in the left kidney.    MUSCULOSKELETAL: Normal.      Impression    IMPRESSION:   1.  Multifocal mild renal cortical thinning/scarring of the right kidney compatible with sequela of prior infection.    2.  Minimal hyperenhancement in the right lower pole kidney with mild urothelial enhancement of the right ureter suspicious for mild pyelonephritis. Recommend correlation with urinalysis.           (ABNORMAL) Urine Culture (08/19/2023 9:51 PM CDT)  Results - (ABNORMAL) Urine Culture (08/19/2023 9:51 PM CDT)  Component Value Ref Range Test Method Analysis Time Performed At Pathologist Signature   Urine Culture Growth (A)     08/21/2023 1:05 PM CDT [facility]     Urine Culture >100,000 CFU/mL Escherichia coli     08/21/2023 1:05 PM CDT [facility]     Comment: This is an edited result. Previous organism was Gram Negative Bacilli on 8/20/2023 at 1051 CDT.     Results - (ABNORMAL) Urine Culture (08/19/2023 9:51 PM CDT)  Specimen (Source) Anatomical Location / Laterality  Collection Method / Volume Collection Time Received Time   Urine URINE SPECIMEN COLLECTION, CLEAN CATCH / Unknown Non-blood Collection / Unknown 08/19/2023 9:51 PM CDT 08/19/2023 10:04 PM CDT     Results - (ABNORMAL) Urine Culture (08/19/2023 9:51 PM CDT)  Organism Antibiotic Method Susceptibility   Escherichia coli Ampicillin/Sulbactam   >16 mcg/mL: Resistant    Escherichia coli Piperacillin/Tazobactam   <=2 mcg/mL: Susceptible    Escherichia coli Cefazolin   >16 mcg/mL: Resistant      Comment: Predicts results for oral agents cefaclor, cefdinir, cefpodoxime, cefprozil, cefuroxime, cephalexin, and loracarbef when used for therapy of uncomplicated UTIs due to E. coli, K. pneumoniae, and Pr. mirabilis. Additionally, in such cases an HUBERT of <16 ug/ml is considered susceptible based on a dosage regiment of 2g administered every 12 hours.   Escherichia coli Ceftriaxone   <=1 mcg/mL: Susceptible    Escherichia coli Cefepime   <=1 mcg/mL: Susceptible    Escherichia coli Ciprofloxacin   0.5 mcg/mL: Intermediate    Escherichia coli Levofloxacin   <=0.5 mcg/mL: Susceptible    Escherichia coli Ertapenem   <=0.25 mcg/mL: Susceptible    Escherichia coli Meropenem   <=0.5 mcg/mL: Susceptible    Escherichia coli Tobramycin   <=2 mcg/mL: Susceptible    Escherichia coli Trimethoprim/Sulfamethoxazole   >2 mcg/mL: Resistant    Escherichia coli Nitrofurantoin   <=16 mcg/mL: Susceptible    Escherichia coli Cefoxitin   8 mcg/mL: Susceptible    Escherichia coli Gentamicin   <=2 mcg/mL: Susceptible    Escherichia coli Cefotetan       Escherichia coli Cefuroxime   <=4 mcg/mL: Susceptible    Escherichia coli Minocycline           Advance Care Planning        Pasquale Pereyra MD  [facility]   Phone: #[phone]

## 2023-08-29 NOTE — PROGRESS NOTES
"PRIMARY DIAGNOSIS: \"GENERIC\" NURSING  OUTPATIENT/OBSERVATION GOALS TO BE MET BEFORE DISCHARGE:  ADLs back to baseline: No    Activity and level of assistance: Up with standby assistance.    Pain status: Improved but still requiring IV narcotics.    Return to near baseline physical activity: Yes     Discharge Planner Nurse   Safe discharge environment identified: Yes  Barriers to discharge: Yes       Entered by: Darling Briones RN 08/29/2023 4:54 AM   VSS on RA. A&Ox4. Pt is lethargic and arouses to voice. Up to bathroom w SBA. No acute changes.   Please review provider order for any additional goals.   Nurse to notify provider when observation goals have been met and patient is ready for discharge.  "

## 2023-08-29 NOTE — PHARMACY-ADMISSION MEDICATION HISTORY
Pharmacist Admission Medication History    Admission medication history is complete. The information provided in this note is only as accurate as the sources available at the time of the update.    Medication reconciliation/reorder completed by provider prior to medication history? No    Information Source(s): Patient and CareEverywhere/SureScripts via in-person    Pertinent Information:     1) patient reports 6 days left with bactrim and last took it yesterday.    Changes made to PTA medication list:  Added: Bactrim    Medication Affordability:  Not including over the counter (OTC) medications, was there a time in the past 3 months when you did not take your medications as prescribed because of cost?: No    Allergies reviewed with patient and updates made in EHR: yes    Medications available for use during hospital stay: NONE.     Medication History Completed By: Kim Alvarez RPH 8/29/2023 7:44 AM    PTA Med List   Medication Sig Last Dose    sulfamethoxazole-trimethoprim (BACTRIM DS) 800-160 MG tablet Take 2 tablets by mouth 2 times daily 8/28/2023

## 2023-08-29 NOTE — PROGRESS NOTES
[facility]    Medicine Progress Note - Hospitalist Service *this note is note billed as my colleague Dr. Pereyra just saw a few hours earlier for admission    Date of Admission:  8/28/2023    Assessment & Plan      Summary:   Jennifer Daniels is a 42 year old female with PMH signficant for UTIs, ischemic CVA with memory issues, repaired PFO, WPW, polysubstance use disorder, MRSA carrier.  8/19 at [facility] ED diagnosed with a UTI.  Given ceftriaxone x1 and discharged on Bactrim.  8/29 evaluated at [facility] ED with complaints of left flank pain and worsening fever despite taking her antibiotics.  Reviewing previous urine culture shows resistance to Bactrim.  Blood work relatively unremarkable.  CT imaging shows right-sided changes consistent with mild pyelo and nonobstructive stones.  Given intravenous ceftriaxone and admitted.    Stable on rounds. Urine culture still pending.     Acute pyelonephritis  Nausea vomiting  Nephrolithiasis nonobstructive  Observation MedSurg admission.  Reviewing [facility] urine culture from 8/29/2023 shows E. coli with resistances to Bactrim.  Symptoms are primarily reported on the left side though CT imaging shows right-sided pyelo-.  Nevertheless we will treat based on clinical picture.  Blood and urine cultures obtained.  Continue intravenous ceftriaxone.  Run IV fluids.   -decrease to 75ccs/hr this afternoon on 8/29   - follow up pending cultures     Cannabis use dependency  History of polysubstance abuse  On admit pt reported daily cannabis use.  July 2023 was at [facility] for opioid overdose.  Responded to Narcan.  Monitor pain medication utilization.  Follow on continuous pulse oximetry.    History of MRSA carrier status  Contact precautions.  Check MRSA swab at the nares.    History of ischemic CVA x6  History of PFO repair  History of WPW  Noted.  Patient notes poor memory.  Follow clinically.       Diet: Regular Diet Adult    DVT Prophylaxis:  Pneumatic Compression Devices and Ambulate every shift  Siegel Catheter: Not present  Lines: None     Cardiac Monitoring: None  Code Status: Full Code      Clinically Significant Risk Factors Present on Admission                                  Disposition Plan      Expected Discharge Date: 08/30/2023                  Fabian Hopkins MD  Hospitalist Service  [facility]  Securely message with fitaborate (more info)  Text page via [employer] Paging/Directory   ______________________________________________________________________    Interval History   -no acute events  -She has no new symptoms, no new pain or shortness of breath, no tachypnea or dyspnea.  On room air  -No new abdominal or back pain  -answered all her Questions   -following cultures    Physical Exam   Vital Signs: Temp: 98.8  F (37.1  C) Temp src: Oral BP: (!) 157/95 Pulse: 61   Resp: 16 SpO2: 100 % O2 Device: None (Room air)    Weight: 130 lbs 0 oz    General: alert, oriented, and in no acute distress  Pulmonary: clear to auscultation bilaterally, normal respiratory effort, on room air, no rales or wheezes or evidence of accessory muscle use  CVS: regular rate and rhythm, no murmurs, rubs, or gallops; no blatant jugular venous distention; no extremity edema and extremities are warm to the touch  GI: soft, nontender, BS+, no rebound or guarding, no conspicuous organomegaly   Neuro: nonfocal, moves all extremities of own volition  Psych: appropriate     Medical Decision Making       50 MINUTES SPENT BY ME on the date of service doing chart review, history, exam, documentation & further activities per the note.  *this note is note billed as my colleague Dr. Pereyra just saw a few hours earlier for admission    Data   ------------------------- PAST 24 HR DATA REVIEWED -----------------------------------------------    I have personally reviewed the following data over the past 24 hrs:    6.6  \   8.6 (L)   / 351     137 104 9 /  104   4.1  26 0.74 \     ALT: 47 (H) AST: 60 (H) AP: 74 TBILI: 0.9   ALB: 3.7 TOT PROTEIN: 7.1 LIPASE: N/A     Procal: N/A CRP: N/A Lactic Acid: 0.7         Imaging results reviewed over the past 24 hrs:   Recent Results (from the past 24 hour(s))   CT Abdomen Pelvis w Contrast    Narrative    EXAM: CT ABDOMEN PELVIS W CONTRAST  LOCATION: [facility]  DATE: 8/29/2023    INDICATION: pyelonephritis  COMPARISON: None.  TECHNIQUE: CT scan of the abdomen and pelvis was performed following injection of IV contrast. Multiplanar reformats were obtained. Dose reduction techniques were used.  CONTRAST: 100 ml Isovue 370    FINDINGS:   LOWER CHEST: Mild bibasilar atelectasis. No consolidation or pleural effusion.    HEPATOBILIARY: Mild hepatic steatosis. Liver and gallbladder otherwise normal. A 5 mm simple cyst is seen in the left hepatic lobe.    PANCREAS: Normal.    SPLEEN: Normal.    ADRENAL GLANDS: Normal.    KIDNEYS/BLADDER: Multifocal mild renal cortical thinning/scarring is seen in the right kidney. There is subtle cortical hypoenhancement in the most inferior pole of the right kidney and mild urothelial enhancement of the right ureter.    BOWEL: Normal.    LYMPH NODES: Normal.    VASCULATURE: Unremarkable.    PELVIC ORGANS: A heterogeneous enhancing lesion is seen in the right adnexal region measuring 2.5 x 2.2 cm, most compatible with a fibroid. A corpus luteal cyst is present in the left kidney.    MUSCULOSKELETAL: Normal.      Impression    IMPRESSION:   1.  Multifocal mild renal cortical thinning/scarring of the right kidney compatible with sequela of prior infection.    2.  Minimal hyperenhancement in the right lower pole kidney with mild urothelial enhancement of the right ureter suspicious for mild pyelonephritis. Recommend correlation with urinalysis.

## 2023-08-29 NOTE — PROGRESS NOTES
Patient received a call from mother, there was a family emergency that patient needs to leave for. Patient AMA paper work was filled out and MD notified, patient was still given antibiotics and mother came to pick patient up.

## 2023-08-30 LAB — BACTERIA UR CULT: NORMAL

## 2023-08-31 ENCOUNTER — PATIENT OUTREACH (OUTPATIENT)
Dept: CARE COORDINATION | Facility: CLINIC | Age: 43
End: 2023-08-31
Payer: COMMERCIAL

## 2023-08-31 NOTE — PROGRESS NOTES
[facility] Contact  [facility]/Voicemail     Clinical Data: Post-Discharge Outreach     Outreach attempted x 2.  Left message on patient's voicemail, providing [facility]'s 24/7 scheduling and nurse triage phone number [phone] ([phone]) for questions/concerns and/or to schedule an appt with an [facility] provider, if they do not have a PCP.      Plan:  [facility] will do no further outreaches at this time.       Melodie Walker RN  [facility] [place], [facility]    *Connected Care Resource Team does NOT follow patient ongoing. Referrals are identified based on internal discharge reports and the outreach is to ensure patient has an understanding of their discharge instructions.

## 2023-09-03 LAB
BACTERIA BLD CULT: NO GROWTH
BACTERIA BLD CULT: NO GROWTH

## 2023-12-17 ENCOUNTER — APPOINTMENT (OUTPATIENT)
Dept: CT IMAGING | Facility: CLINIC | Age: 43
End: 2023-12-17
Attending: EMERGENCY MEDICINE
Payer: COMMERCIAL

## 2023-12-17 ENCOUNTER — APPOINTMENT (OUTPATIENT)
Dept: RADIOLOGY | Facility: CLINIC | Age: 43
End: 2023-12-17
Attending: EMERGENCY MEDICINE
Payer: COMMERCIAL

## 2023-12-17 ENCOUNTER — HOSPITAL ENCOUNTER (EMERGENCY)
Facility: CLINIC | Age: 43
Discharge: HOME | End: 2023-12-17
Attending: EMERGENCY MEDICINE | Admitting: EMERGENCY MEDICINE
Payer: COMMERCIAL

## 2023-12-17 VITALS
OXYGEN SATURATION: 97 % | SYSTOLIC BLOOD PRESSURE: 161 MMHG | TEMPERATURE: 98.4 F | BODY MASS INDEX: 19.42 KG/M2 | WEIGHT: 131.5 LBS | DIASTOLIC BLOOD PRESSURE: 94 MMHG | HEART RATE: 71 BPM | RESPIRATION RATE: 24 BRPM

## 2023-12-17 DIAGNOSIS — J10.1 INFLUENZA A: ICD-10-CM

## 2023-12-17 DIAGNOSIS — J10.00 PNEUMONIA DUE TO INFLUENZA A VIRUS: ICD-10-CM

## 2023-12-17 LAB
ACANTHOCYTES BLD QL SMEAR: ABNORMAL
ANION GAP SERPL CALCULATED.3IONS-SCNC: 9 MMOL/L (ref 7–15)
AUER BODIES BLD QL SMEAR: ABNORMAL
BASO STIPL BLD QL SMEAR: ABNORMAL
BASOPHILS # BLD AUTO: 0.1 10E3/UL (ref 0–0.2)
BASOPHILS NFR BLD AUTO: 1 %
BITE CELLS BLD QL SMEAR: ABNORMAL
BLISTER CELLS BLD QL SMEAR: ABNORMAL
BUN SERPL-MCNC: 8.7 MG/DL (ref 6–20)
BURR CELLS BLD QL SMEAR: ABNORMAL
CALCIUM SERPL-MCNC: 9.3 MG/DL (ref 8.6–10)
CHLORIDE SERPL-SCNC: 103 MMOL/L (ref 98–107)
CREAT SERPL-MCNC: 0.54 MG/DL (ref 0.51–0.95)
DACRYOCYTES BLD QL SMEAR: ABNORMAL
DEPRECATED HCO3 PLAS-SCNC: 26 MMOL/L (ref 22–29)
EGFRCR SERPLBLD CKD-EPI 2021: >90 ML/MIN/1.73M2
ELLIPTOCYTES BLD QL SMEAR: SLIGHT
EOSINOPHIL # BLD AUTO: 0.2 10E3/UL (ref 0–0.7)
EOSINOPHIL NFR BLD AUTO: 3 %
ERYTHROCYTE [DISTWIDTH] IN BLOOD BY AUTOMATED COUNT: 17.3 % (ref 10–15)
FRAGMENTS BLD QL SMEAR: ABNORMAL
GLUCOSE SERPL-MCNC: 122 MG/DL (ref 70–99)
HCT VFR BLD AUTO: 29.8 % (ref 35–47)
HGB BLD-MCNC: 8.4 G/DL (ref 11.7–15.7)
HGB C CRYSTALS: ABNORMAL
HOWELL-JOLLY BOD BLD QL SMEAR: ABNORMAL
IMM GRANULOCYTES # BLD: 0.1 10E3/UL
IMM GRANULOCYTES NFR BLD: 1 %
LYMPHOCYTES # BLD AUTO: 2.3 10E3/UL (ref 0.8–5.3)
LYMPHOCYTES NFR BLD AUTO: 30 %
MCH RBC QN AUTO: 18.1 PG (ref 26.5–33)
MCHC RBC AUTO-ENTMCNC: 28.2 G/DL (ref 31.5–36.5)
MCV RBC AUTO: 64 FL (ref 78–100)
MONOCYTES # BLD AUTO: 0.8 10E3/UL (ref 0–1.3)
MONOCYTES NFR BLD AUTO: 10 %
NEUTROPHILS # BLD AUTO: 4.3 10E3/UL (ref 1.6–8.3)
NEUTROPHILS NFR BLD AUTO: 55 %
NEUTS HYPERSEG BLD QL SMEAR: ABNORMAL
NRBC # BLD AUTO: 0 10E3/UL
NRBC BLD AUTO-RTO: 0 /100
PLAT MORPH BLD: ABNORMAL
PLATELET # BLD AUTO: 494 10E3/UL (ref 150–450)
POLYCHROMASIA BLD QL SMEAR: ABNORMAL
POTASSIUM SERPL-SCNC: 3.9 MMOL/L (ref 3.4–5.3)
RBC # BLD AUTO: 4.64 10E6/UL (ref 3.8–5.2)
RBC AGGLUT BLD QL: ABNORMAL
RBC MORPH BLD: ABNORMAL
ROULEAUX BLD QL SMEAR: ABNORMAL
SICKLE CELLS BLD QL SMEAR: ABNORMAL
SMUDGE CELLS BLD QL SMEAR: ABNORMAL
SODIUM SERPL-SCNC: 138 MMOL/L (ref 135–145)
SPHEROCYTES BLD QL SMEAR: ABNORMAL
STOMATOCYTES BLD QL SMEAR: SLIGHT
TARGETS BLD QL SMEAR: ABNORMAL
TOXIC GRANULES BLD QL SMEAR: ABNORMAL
VARIANT LYMPHS BLD QL SMEAR: ABNORMAL
WBC # BLD AUTO: 7.8 10E3/UL (ref 4–11)

## 2023-12-17 PROCEDURE — 99285 EMERGENCY DEPT VISIT HI MDM: CPT | Mod: 25

## 2023-12-17 PROCEDURE — 250N000011 HC RX IP 250 OP 636: Performed by: EMERGENCY MEDICINE

## 2023-12-17 PROCEDURE — 85025 COMPLETE CBC W/AUTO DIFF WBC: CPT | Performed by: EMERGENCY MEDICINE

## 2023-12-17 PROCEDURE — 71046 X-RAY EXAM CHEST 2 VIEWS: CPT

## 2023-12-17 PROCEDURE — 250N000013 HC RX MED GY IP 250 OP 250 PS 637: Performed by: EMERGENCY MEDICINE

## 2023-12-17 PROCEDURE — 71275 CT ANGIOGRAPHY CHEST: CPT

## 2023-12-17 PROCEDURE — 80048 BASIC METABOLIC PNL TOTAL CA: CPT | Performed by: EMERGENCY MEDICINE

## 2023-12-17 PROCEDURE — 36415 COLL VENOUS BLD VENIPUNCTURE: CPT | Performed by: EMERGENCY MEDICINE

## 2023-12-17 PROCEDURE — 96374 THER/PROPH/DIAG INJ IV PUSH: CPT | Mod: 59

## 2023-12-17 RX ORDER — DOXYCYCLINE 100 MG/1
100 CAPSULE ORAL 2 TIMES DAILY
Qty: 14 CAPSULE | Refills: 0 | Status: SHIPPED | OUTPATIENT
Start: 2023-12-17 | End: 2023-12-24

## 2023-12-17 RX ORDER — IOPAMIDOL 755 MG/ML
100 INJECTION, SOLUTION INTRAVASCULAR ONCE
Status: COMPLETED | OUTPATIENT
Start: 2023-12-17 | End: 2023-12-17

## 2023-12-17 RX ORDER — ACETAMINOPHEN 325 MG/1
650 TABLET ORAL ONCE
Status: COMPLETED | OUTPATIENT
Start: 2023-12-17 | End: 2023-12-17

## 2023-12-17 RX ORDER — KETOROLAC TROMETHAMINE 15 MG/ML
15 INJECTION, SOLUTION INTRAMUSCULAR; INTRAVENOUS ONCE
Status: COMPLETED | OUTPATIENT
Start: 2023-12-17 | End: 2023-12-17

## 2023-12-17 RX ADMIN — KETOROLAC TROMETHAMINE 15 MG: 15 INJECTION, SOLUTION INTRAMUSCULAR; INTRAVENOUS at 06:16

## 2023-12-17 RX ADMIN — ACETAMINOPHEN 650 MG: 325 TABLET ORAL at 05:51

## 2023-12-17 RX ADMIN — IOPAMIDOL 100 ML: 755 INJECTION, SOLUTION INTRAVENOUS at 05:16

## 2023-12-17 ASSESSMENT — ACTIVITIES OF DAILY LIVING (ADL): ADLS_ACUITY_SCORE: 35

## 2023-12-17 NOTE — ED PROVIDER NOTES
EMERGENCY DEPARTMENT ENCOUNTER      NAME: Jennifer Daniels  AGE: 43 year old female  YOB: 1980  MRN: [id number]  EVALUATION DATE & TIME: No admission date for patient encounter.    PCP: No Ref-Primary, Physician    ED PROVIDER: Vinny Tate M.D.      Chief Complaint   Patient presents with    Cough    Headache    Fever    Nausea, Vomiting, & Diarrhea         FINAL IMPRESSION:  1. Influenza A    2. Pneumonia due to influenza A virus          ED COURSE & MEDICAL DECISION MAKING:    Pertinent Labs & Imaging studies reviewed. (See chart for details)  43 year old female presents to the Emergency Department for evaluation of cough fever headache.  Was recently diagnosed with influenza.  I suspect her symptoms are due to that.  Was told she has a low hemoglobin.  Reviewing her chart she had a low hemoglobin in the past.  Rechecked at 8.4 here which is consistent with previous of 8.5.  Did do a chest x-ray that should did show some diffuse infiltrates.  Did get a CT scan to better delineate this.  Seems likely influenza pneumonia.  I think is less likely tuberculosis or other more sinister pneumonia.  However will cover her with doxycycline in case there is a bacterial component.  Does have an elevated platelet likely reactive secondary to her influenza.  White count is normal.  Give the patient IV fluids IV Toradol and Tylenol.  I suspect her symptoms are due to her influenza.  Oxygen saturation is normal.  Not tachycardic.  Will discharge home.  Will have her follow-up with primary.  Return for worsening symptoms.  Did refer for primary care    3:42 AM I met with the patient to gather history and to perform my initial exam. I discussed the plan for care while in the Emergency Department.     At the conclusion of the encounter I discussed the results of all of the tests and the disposition. The questions were answered. The patient or family acknowledged understanding and was agreeable with the care plan.      Medical Decision Making    History:  Supplemental history from: Documented in chart, if applicable  External Record(s) reviewed: Documented in chart, if applicable. and Outpatient Record: [facility] Visit on 12/12/23     Work Up:  Chart documentation includes differential considered and any EKGs or imaging independently interpreted by provider, where specified.  In additional to work up documented, I considered the following work up: Documented in chart, if applicable.    External consultation:  Discussion of management with another provider: Documented in chart, if applicable    Complicating factors:  Care impacted by chronic illness: N/A  Care affected by social determinants of health: Access to Medical Care    Disposition considerations: Discharge. No recommendations on prescription strength medication(s). N/A.         MEDICATIONS GIVEN IN THE EMERGENCY:  Medications   iopamidol (ISOVUE-370) solution 100 mL (100 mLs Intravenous $Given 12/17/23 0516)   acetaminophen (TYLENOL) tablet 650 mg (650 mg Oral $Given 12/17/23 0551)   ketorolac (TORADOL) injection 15 mg (15 mg Intravenous $Given 12/17/23 0616)       NEW PRESCRIPTIONS STARTED AT TODAY'S ER VISIT  New Prescriptions    DOXYCYCLINE HYCLATE (VIBRAMYCIN) 100 MG CAPSULE    Take 1 capsule (100 mg) by mouth 2 times daily for 7 days          =================================================================    HPI    Patient information was obtained from: Patient    Use of : N/A        Jennifer Daniels is a 43 year old female with a pertinent history of CVA, THC use disorder, polysubstance abuse, opioid use disorder, methamphetamine use disorder, who presents to this ED for evaluation of cough, headache.    Patient reports she was seen at [facility] several days ago. At that time, Chest XR done. She tested positive for influenza A. Patient also noted to have low hemoglobin of around 6, and was given potassium. They planned to do a blood transfusion  but because they were unable to get an IV in, she did not get this done. Patient was discharged home without any medications. Patient reports persisting headache, cough, nausea, vomiting, and abdominal pain secondary to cough. She states that because her symptoms have persisted, she decided to be seen in the ED. Otherwise, she denies any diarrhea or vaginal bleeding. Patient just finished her menstrual period. She notes allergies to Vicodin. Patient does not have a primary care provider at this time. No other complaints at this time.    Per chart review, patient was seen at [facility] on 12/12/23 for influenza A. ECG showed normal sinus rhythm. Right atrial enlargement. Prolonged QT. Chest XR imaging showed scattered bilateral patchy nodular airspace opacities concerning for aninfectious process although underlying pulmonary nodules are not excluded. COVID and influenza swab done. Positive for influenza A. CBC and CMP done. UA done. Troponin normal. Patient did not want to do a stool quaiac to test for blood in stool. Patient yelled at the nurses as they tried to put in IVs to give fluids. An IV was later started by a third nurse to try and the patient ripped her arm away pulling out the IV. patient received 40 meq of potassium po. Explained blood count was very low and her sodium and potassium are both low and positive test for influenza A. Incomplete evaluation as patient's choice. Recommended to follow up in two days.     PAST MEDICAL HISTORY:  History reviewed. No pertinent past medical history.    PAST SURGICAL HISTORY:  Past Surgical History:   Procedure Laterality Date    Nephrolithiasis      REPAIR PATENT FORAMEN OVALE      TUBAL LIGATION             CURRENT MEDICATIONS:    No current facility-administered medications for this encounter.     Current Outpatient Medications   Medication    doxycycline hyclate (VIBRAMYCIN) 100 MG capsule         ALLERGIES:  Allergies   Allergen Reactions     Hydrocodone-Acetaminophen Nausea and Vomiting     Can take percocet       FAMILY HISTORY:  Family History   Problem Relation Age of Onset    Asthma Sister     Asthma Brother        SOCIAL HISTORY:   Social History     Socioeconomic History    Marital status: Single   Tobacco Use    Smoking status: Never   Substance and Sexual Activity    Alcohol use: Not Currently    Drug use: Yes     Frequency: 7.0 times per week     Types: Marijuana       VITALS:  BP (!) 161/94   Pulse 71   Temp 98.4  F (36.9  C) (Oral)   Resp 24   Wt 59.6 kg (131 lb 8 oz)   LMP 12/11/2023   SpO2 97%   BMI 19.42 kg/m      PHYSICAL EXAM    Physical Exam  Constitutional:       General: She is not in acute distress.     Appearance: She is not diaphoretic.   HENT:      Head: Atraumatic.      Mouth/Throat:      Pharynx: No oropharyngeal exudate.   Eyes:      General: No scleral icterus.     Pupils: Pupils are equal, round, and reactive to light.   Cardiovascular:      Heart sounds: Normal heart sounds.   Pulmonary:      Effort: No respiratory distress.      Breath sounds: Normal breath sounds.   Abdominal:      Palpations: Abdomen is soft.      Tenderness: There is no abdominal tenderness. There is no guarding or rebound.   Musculoskeletal:         General: No tenderness.   Skin:     General: Skin is warm.      Findings: No rash.   Neurological:      General: No focal deficit present.      Mental Status: She is alert.      Comments: 5 out of 5 strength in bilateral upper and lower extremities.  Sensation intact in all 4 extremes.  Cranial nerves intact.  No pronator drift               LAB:  All pertinent labs reviewed and interpreted.  Labs Ordered and Resulted from Time of ED Arrival to Time of ED Departure   BASIC METABOLIC PANEL - Abnormal       Result Value    Sodium 138      Potassium 3.9      Chloride 103      Carbon Dioxide (CO2) 26      Anion Gap 9      Urea Nitrogen 8.7      Creatinine 0.54      GFR Estimate >90      Calcium 9.3       Glucose 122 (*)    CBC WITH PLATELETS AND DIFFERENTIAL - Abnormal    WBC Count 7.8      RBC Count 4.64      Hemoglobin 8.4 (*)     Hematocrit 29.8 (*)     MCV 64 (*)     MCH 18.1 (*)     MCHC 28.2 (*)     RDW 17.3 (*)     Platelet Count 494 (*)     % Neutrophils 55      % Lymphocytes 30      % Monocytes 10      % Eosinophils 3      % Basophils 1      % Immature Granulocytes 1      NRBCs per 100 WBC 0      Absolute Neutrophils 4.3      Absolute Lymphocytes 2.3      Absolute Monocytes 0.8      Absolute Eosinophils 0.2      Absolute Basophils 0.1      Absolute Immature Granulocytes 0.1      Absolute NRBCs 0.0     RBC AND PLATELET MORPHOLOGY - Abnormal    Platelet Assessment        Value: Automated Count Confirmed. Platelet morphology is normal.    Acanthocytes        Florentino Rods        Basophilic Stippling        Bite Cells        Blister Cells        Henriette Cells        Elliptocytes Slight (*)     Hgb C Crystals        Yates-Jolly Bodies        Hypersegmented Neutrophils        Polychromasia        RBC agglutination        RBC Fragments        Reactive Lymphocytes        Rouleaux        Sickle Cells        Smudge Cells        Spherocytes        Stomatocytes Slight (*)     Target Cells        Teardrop Cells        Toxic Neutrophils        RBC Morphology Confirmed RBC Indices         RADIOLOGY:  Reviewed all pertinent imaging. Please see official radiology report.  CT Chest Pulmonary Embolism w Contrast   Final Result   IMPRESSION:   1.  No evidence of pulmonary emboli to the segmental level.   2.  Multifocal groundglass and less so consolidative opacities concerning for infectious and/or inflammatory process. Correlation with patient's clinical symptomatology and sputum analysis is recommended.      Chest XR,  PA & LAT   Final Result   IMPRESSION: Normal heart size with ASD closure device in place. Patchy peripheral, bilateral airspace opacities likely to represent pneumonia, though given distribution and characteristics  septic emboli could be considered. Malignancy would be less    likely though follow-up radiographs are recommended to ensure resolution of findings. No pneumothorax or pleural effusion.                I, Griselda Palomares, am serving as a scribe to document services personally performed by Dr. Vinny Tate, based on my observation and the provider's statements to me. I, Vinny Tate MD attest that Griselda Palomares is acting in a scribe capacity, has observed my performance of the services and has documented them in accordance with my direction.    Vinny Tate M.D.  Emergency Medicine  [facility] EMERGENCY ROOM  [address] [address]  [phone]  Dept: [phone]       Vinny Tate MD  12/17/23 0653

## 2023-12-17 NOTE — ED TRIAGE NOTES
Was in [facility] 3 days ago. Had influenza A and a low hemoglobin. Was told to go to be seen if not better. Has cough, headache, and fatigue. Emesis x 1. Denies diarrhea. States hemoglobin was around 6 when seen in [place].

## 2024-08-14 ENCOUNTER — HOSPITAL ENCOUNTER (EMERGENCY)
Facility: CLINIC | Age: 44
Discharge: HOME | End: 2024-08-15
Attending: EMERGENCY MEDICINE | Admitting: EMERGENCY MEDICINE

## 2024-08-14 VITALS
HEART RATE: 73 BPM | DIASTOLIC BLOOD PRESSURE: 62 MMHG | BODY MASS INDEX: 20.73 KG/M2 | RESPIRATION RATE: 20 BRPM | HEIGHT: 69 IN | TEMPERATURE: 98.7 F | SYSTOLIC BLOOD PRESSURE: 155 MMHG | OXYGEN SATURATION: 100 % | WEIGHT: 140 LBS

## 2024-08-14 DIAGNOSIS — H60.12 CELLULITIS OF LEFT EAR CANAL: ICD-10-CM

## 2024-08-14 DIAGNOSIS — K02.9 DENTAL DECAY: ICD-10-CM

## 2024-08-14 DIAGNOSIS — K04.7 DENTAL INFECTION: ICD-10-CM

## 2024-08-14 PROCEDURE — 250N000013 HC RX MED GY IP 250 OP 250 PS 637: Performed by: EMERGENCY MEDICINE

## 2024-08-14 PROCEDURE — 99284 EMERGENCY DEPT VISIT MOD MDM: CPT

## 2024-08-14 RX ORDER — IBUPROFEN 600 MG/1
600 TABLET, FILM COATED ORAL EVERY 6 HOURS PRN
Qty: 20 TABLET | Refills: 0 | Status: SHIPPED | OUTPATIENT
Start: 2024-08-14

## 2024-08-14 RX ORDER — OXYCODONE HYDROCHLORIDE 5 MG/1
10 TABLET ORAL ONCE
Status: COMPLETED | OUTPATIENT
Start: 2024-08-15 | End: 2024-08-14

## 2024-08-14 RX ORDER — FLUCONAZOLE 150 MG/1
150 TABLET ORAL ONCE
Qty: 2 TABLET | Refills: 0 | Status: SHIPPED | OUTPATIENT
Start: 2024-08-15 | End: 2024-08-15

## 2024-08-14 RX ADMIN — AMOXICILLIN AND CLAVULANATE POTASSIUM 1 TABLET: 875; 125 TABLET, FILM COATED ORAL at 23:56

## 2024-08-14 RX ADMIN — OXYCODONE HYDROCHLORIDE 10 MG: 5 TABLET ORAL at 23:56

## 2024-08-14 ASSESSMENT — COLUMBIA-SUICIDE SEVERITY RATING SCALE - C-SSRS
1. IN THE PAST MONTH, HAVE YOU WISHED YOU WERE DEAD OR WISHED YOU COULD GO TO SLEEP AND NOT WAKE UP?: NO
2. HAVE YOU ACTUALLY HAD ANY THOUGHTS OF KILLING YOURSELF IN THE PAST MONTH?: NO
6. HAVE YOU EVER DONE ANYTHING, STARTED TO DO ANYTHING, OR PREPARED TO DO ANYTHING TO END YOUR LIFE?: NO

## 2024-08-14 ASSESSMENT — ENCOUNTER SYMPTOMS: SLEEP DISTURBANCE: 1

## 2024-08-15 NOTE — ED PROVIDER NOTES
NAME: Jennifer Daniels  AGE: 43 year old female  YOB: 1980  MRN: [id number]  EVALUATION DATE & TIME: 2024 11:08 PM    PCP: No Ref-Primary, Physician    ED PROVIDER: Zach Saldana M.D.      Chief Complaint   Patient presents with    Otalgia           Dental Pain     FINAL IMPRESSION:  1. Dental infection    2. Dental decay    3. Cellulitis of left ear canal      MEDICAL DECISION MAKIN:14 PM I met with the patient, obtained history, performed an initial exam, and discussed options and plan for diagnostics and treatment here in the ED.   11:53 PM We discussed the plan for discharge and the patient is agreeable. Reviewed supportive cares, symptomatic treatment, outpatient follow up, and reasons to return to the Emergency Department. Patient to be discharged by ED RN.    Patient was clinically assessed and consented to treatment. After assessment, medical decision making and workup were discussed with the patient. The patient was agreeable to plan for testing, workup, and treatment.  Pertinent Labs & Imaging studies reviewed. (See chart for details)       Medical Decision Making  Obtained supplemental history:Supplemental history obtained?: Documented in chart  Reviewed external records: External records reviewed?: Documented in chart  Care impacted by chronic illness:N/A  Care significantly affected by social determinants of health:Access to Medical Care  Did you consider but not order tests?: Work up considered but not performed and documented in chart, if applicable  Did you interpret images independently?: Independent interpretation of ECG and images noted in documentation, when applicable.  Consultation discussion with other provider:Did you involve another provider (consultant, MH, pharmacy, etc.)?: No  Discharge. I prescribed additional prescription strength medication(s) as charted. See documentation for any additional details.    Jennifer Daniels is a 43 year old female who  presents with left-sided dental pain and ear pain.   Differential diagnosis includes but not limited to dental infection, facial cellulitis, otitis media, otitis externa.  Patient is a 43-year-old female with history of bad dentition and decay on the left lower dental decay down to the gum.  Patient's findings on exam does show decay down to the gum with exposure of the pulp and when she has picked with some of the dried stuff left in the room she has expressed purulence.  I cannot find any purulent pockets or swellings, no gingival erythema but likely with deeper dental abscess or infection.  No signs of facial cellulitis, airway obstruction, or Lud[place]ig angina.  Patient also reports pain at the ear but is not in her ear is actually an area over the tragus where she has picked at a pimple.  The area is now erythematous and tender.  After discussion with patient I would place patient on antibiotics to cover both the tooth and the skin infection externally.  Also patient will need follow-up with dentistry to definitively treat that tooth.  I did also recommend follow-up with her primary doctor for recheck of the wound though antibiotic should cover any cellulitis there.  Patient comfortable with plan will be discharged home with prescriptions.    0 minutes of critical care time    MEDICATIONS GIVEN IN THE EMERGENCY:  Medications   oxyCODONE (ROXICODONE) tablet 10 mg (10 mg Oral $Given 8/14/24 2356)   amoxicillin-clavulanate (AUGMENTIN) 875-125 MG per tablet 1 tablet (1 tablet Oral $Given 8/14/24 2356)       NEW PRESCRIPTIONS STARTED AT TODAY'S ER VISIT:  Discharge Medication List as of 8/15/2024 12:00 AM        START taking these medications    Details   amoxicillin-clavulanate (AUGMENTIN) 875-125 MG tablet Take 1 tablet by mouth 2 times daily for 7 days, Disp-14 tablet, R-0, Local Print      fluconazole (DIFLUCAN) 150 MG tablet Take 1 tablet (150 mg) by mouth once for 1 dose, Disp-2 tablet, R-0, Local Print     "  ibuprofen (ADVIL/MOTRIN) 600 MG tablet Take 1 tablet (600 mg) by mouth every 6 hours as needed for moderate pain, Disp-20 tablet, R-0, Local Print              =================================================================    HPI    Patient information was obtained from: Patient    Use of : N/A      Jennifer Daniels is a 43 year old female with a history of methamphetamine abuse and polysubstance use disorder, who presents with ear and dental pain.    The patient reports left lower dental pain for the last couple of days, worsening.  She reports a cracked lower left tooth for \"quite some time\".  She reports after the dental pain started that she later developed left ear pain.  She also notes some acne to her left cheek that she has been picking.  She also notes trying to pick at the cracked tooth to clean it out.  She does note trouble sleeping due to the pain.  She denies any antibiotic allergies. She denies any other complaints at this time.      REVIEW OF SYSTEMS   Review of Systems   HENT:  Positive for ear pain (left).         Positive for left lower dental pain  Positive for left lower cracked tooth   Psychiatric/Behavioral:  Positive for sleep disturbance (due to pain).    All other systems reviewed and are negative.     PAST MEDICAL HISTORY:  No past medical history on file.    PAST SURGICAL HISTORY:  Past Surgical History:   Procedure Laterality Date    Nephrolithiasis      REPAIR PATENT FORAMEN OVALE      TUBAL LIGATION         CURRENT MEDICATIONS:    No current facility-administered medications for this encounter.    Current Outpatient Medications:     amoxicillin-clavulanate (AUGMENTIN) 875-125 MG tablet, Take 1 tablet by mouth 2 times daily for 7 days, Disp: 14 tablet, Rfl: 0    fluconazole (DIFLUCAN) 150 MG tablet, Take 1 tablet (150 mg) by mouth once for 1 dose, Disp: 2 tablet, Rfl: 0    ibuprofen (ADVIL/MOTRIN) 600 MG tablet, Take 1 tablet (600 mg) by mouth every 6 hours as needed for " "moderate pain, Disp: 20 tablet, Rfl: 0    ALLERGIES:  Allergies   Allergen Reactions    Hydrocodone-Acetaminophen Nausea and Vomiting     Can take percocet       FAMILY HISTORY:  Family History   Problem Relation Age of Onset    Asthma Sister     Asthma Brother        SOCIAL HISTORY:   Social History     Socioeconomic History    Marital status: Single   Tobacco Use    Smoking status: Never   Substance and Sexual Activity    Alcohol use: Not Currently    Drug use: Yes     Frequency: 7.0 times per week     Types: Marijuana     Social Determinants of Health      [facility]    Financial Resource Strain    [facility], [facility]    Social Connections       PHYSICAL EXAM:    Vitals: BP (!) 155/62   Pulse 73   Temp 98.7  F (37.1  C) (Oral)   Resp 20   Ht 1.753 m (5' 9\")   Wt 63.5 kg (140 lb)   SpO2 100%   BMI 20.67 kg/m     Physical Exam  Vitals and nursing note reviewed.   Constitutional:       General: She is not in acute distress.     Appearance: Normal appearance. She is normal weight. She is not ill-appearing or toxic-appearing.   HENT:      Head: Atraumatic.      Comments: No facial swelling.     Left Ear: Tenderness present.      Ears:        Nose: Nose normal.      Mouth/Throat:      Dentition: Abnormal dentition. Dental tenderness and dental caries present. No gingival swelling or gum lesions.     Neurological:      Mental Status: She is alert.           LAB:  All pertinent labs reviewed and interpreted.  Labs Ordered and Resulted from Time of ED Arrival to Time of ED Departure - No data to display    RADIOLOGY:  No orders to display     PROCEDURES:   Procedures     I, Santi Sawyer, am serving as a scribe to document services personally performed by Dr. Zach Saldana  based on my observation and the provider's statements to me. " I, Zach Saldana MD attest that Santi Digna is acting in a scribe capacity, has observed my performance of the services and has documented them in accordance with my direction.    Zach Saldana M.D.  Emergency Medicine  [facility] Emergency Department      Zach Saldana MD  08/15/24 0571

## 2024-08-15 NOTE — DISCHARGE INSTRUCTIONS
Many of these clinics offer a sliding fee option for patients that qualify, and see patients on a walk-in or same day basis. Please call each clinic directly. As services, hours, fees and policies vary greatly.    [place]:  [facility]     [phone]  [facility] ([facility]) [phone]  [facility]  [phone]  [facility]      [phone]   [facility]    [phone]  [facility]  [phone]  [facility] [phone]  [facility]     [phone]  [facility]   [phone]  [facility] (cash only)   [phone]  [facility]    [phone] (adults)          [phone] (children)    [place]:  [facility]     [phone]; [phone]  [facility]     [phone]  [facility]     [phone]  [facility] (free, limited)    [phone]    Multiple Locations:  [facility]       [phone]

## 2024-08-15 NOTE — ED TRIAGE NOTES
Patient arrives to the ER with c/o left ear pain and tooth pain. The pain started 3 days ago. She states that the pain started in the tooth area and has migrated to her ear.      Triage Assessment (Adult)       Row Name 08/14/24 2207          Triage Assessment    Airway WDL WDL        Respiratory WDL    Respiratory WDL WDL        Skin Circulation/Temperature WDL    Skin Circulation/Temperature WDL WDL        Cardiac WDL    Cardiac WDL WDL        Peripheral/Neurovascular WDL    Peripheral Neurovascular WDL WDL        Cognitive/Neuro/Behavioral WDL    Cognitive/Neuro/Behavioral WDL WDL

## 2024-10-09 ENCOUNTER — HOSPITAL ENCOUNTER (EMERGENCY)
Facility: CLINIC | Age: 44
Discharge: HOME | End: 2024-10-09
Attending: EMERGENCY MEDICINE | Admitting: EMERGENCY MEDICINE
Payer: MEDICAID

## 2024-10-09 VITALS
DIASTOLIC BLOOD PRESSURE: 64 MMHG | HEART RATE: 61 BPM | RESPIRATION RATE: 16 BRPM | BODY MASS INDEX: 19.26 KG/M2 | WEIGHT: 130 LBS | TEMPERATURE: 97.1 F | HEIGHT: 69 IN | SYSTOLIC BLOOD PRESSURE: 120 MMHG | OXYGEN SATURATION: 100 %

## 2024-10-09 DIAGNOSIS — R11.2 NAUSEA AND VOMITING, UNSPECIFIED VOMITING TYPE: ICD-10-CM

## 2024-10-09 DIAGNOSIS — R51.9 ACUTE NONINTRACTABLE HEADACHE, UNSPECIFIED HEADACHE TYPE: ICD-10-CM

## 2024-10-09 DIAGNOSIS — E87.6 HYPOKALEMIA: ICD-10-CM

## 2024-10-09 LAB
ALBUMIN SERPL BCG-MCNC: 4.3 G/DL (ref 3.5–5.2)
ALBUMIN UR-MCNC: NEGATIVE MG/DL
ALP SERPL-CCNC: 92 U/L (ref 40–150)
ALT SERPL W P-5'-P-CCNC: 64 U/L (ref 0–50)
ANION GAP SERPL CALCULATED.3IONS-SCNC: 12 MMOL/L (ref 7–15)
APPEARANCE UR: ABNORMAL
AST SERPL W P-5'-P-CCNC: 75 U/L (ref 0–45)
BACTERIA #/AREA URNS HPF: ABNORMAL /HPF
BASOPHILS # BLD AUTO: 0.1 10E3/UL (ref 0–0.2)
BASOPHILS NFR BLD AUTO: 1 %
BILIRUB SERPL-MCNC: 0.5 MG/DL
BILIRUB UR QL STRIP: NEGATIVE
BUN SERPL-MCNC: 9.1 MG/DL (ref 6–20)
CALCIUM SERPL-MCNC: 9.1 MG/DL (ref 8.8–10.4)
CHLORIDE SERPL-SCNC: 102 MMOL/L (ref 98–107)
COLOR UR AUTO: ABNORMAL
CREAT SERPL-MCNC: 0.77 MG/DL (ref 0.51–0.95)
EGFRCR SERPLBLD CKD-EPI 2021: >90 ML/MIN/1.73M2
EOSINOPHIL # BLD AUTO: 0.3 10E3/UL (ref 0–0.7)
EOSINOPHIL NFR BLD AUTO: 3 %
ERYTHROCYTE [DISTWIDTH] IN BLOOD BY AUTOMATED COUNT: 16.9 % (ref 10–15)
FLUAV RNA SPEC QL NAA+PROBE: NEGATIVE
FLUBV RNA RESP QL NAA+PROBE: NEGATIVE
GLUCOSE SERPL-MCNC: 121 MG/DL (ref 70–99)
GLUCOSE UR STRIP-MCNC: NEGATIVE MG/DL
HCO3 SERPL-SCNC: 26 MMOL/L (ref 22–29)
HCT VFR BLD AUTO: 37 % (ref 35–47)
HGB BLD-MCNC: 10.5 G/DL (ref 11.7–15.7)
HGB UR QL STRIP: NEGATIVE
IMM GRANULOCYTES # BLD: 0 10E3/UL
IMM GRANULOCYTES NFR BLD: 0 %
KETONES UR STRIP-MCNC: NEGATIVE MG/DL
LACTATE SERPL-SCNC: 1 MMOL/L (ref 0.7–2)
LEUKOCYTE ESTERASE UR QL STRIP: NEGATIVE
LYMPHOCYTES # BLD AUTO: 2 10E3/UL (ref 0.8–5.3)
LYMPHOCYTES NFR BLD AUTO: 25 %
MCH RBC QN AUTO: 19.4 PG (ref 26.5–33)
MCHC RBC AUTO-ENTMCNC: 28.4 G/DL (ref 31.5–36.5)
MCV RBC AUTO: 69 FL (ref 78–100)
MONOCYTES # BLD AUTO: 0.6 10E3/UL (ref 0–1.3)
MONOCYTES NFR BLD AUTO: 7 %
MUCOUS THREADS #/AREA URNS LPF: PRESENT /LPF
NEUTROPHILS # BLD AUTO: 5.4 10E3/UL (ref 1.6–8.3)
NEUTROPHILS NFR BLD AUTO: 64 %
NITRATE UR QL: NEGATIVE
NRBC # BLD AUTO: 0 10E3/UL
NRBC BLD AUTO-RTO: 0 /100
PH UR STRIP: 7 [PH] (ref 5–7)
PLATELET # BLD AUTO: 341 10E3/UL (ref 150–450)
POTASSIUM SERPL-SCNC: 3 MMOL/L (ref 3.4–5.3)
PROT SERPL-MCNC: 7.7 G/DL (ref 6.4–8.3)
RBC # BLD AUTO: 5.4 10E6/UL (ref 3.8–5.2)
RBC URINE: <1 /HPF
RSV RNA SPEC NAA+PROBE: NEGATIVE
SARS-COV-2 RNA RESP QL NAA+PROBE: NEGATIVE
SODIUM SERPL-SCNC: 140 MMOL/L (ref 135–145)
SP GR UR STRIP: 1.01 (ref 1–1.03)
SQUAMOUS EPITHELIAL: <1 /HPF
UROBILINOGEN UR STRIP-MCNC: <2 MG/DL
WBC # BLD AUTO: 8.3 10E3/UL (ref 4–11)
WBC URINE: 5 /HPF

## 2024-10-09 PROCEDURE — 96375 TX/PRO/DX INJ NEW DRUG ADDON: CPT

## 2024-10-09 PROCEDURE — 81001 URINALYSIS AUTO W/SCOPE: CPT | Performed by: EMERGENCY MEDICINE

## 2024-10-09 PROCEDURE — 36415 COLL VENOUS BLD VENIPUNCTURE: CPT | Performed by: EMERGENCY MEDICINE

## 2024-10-09 PROCEDURE — 85025 COMPLETE CBC W/AUTO DIFF WBC: CPT | Performed by: EMERGENCY MEDICINE

## 2024-10-09 PROCEDURE — 250N000013 HC RX MED GY IP 250 OP 250 PS 637: Performed by: EMERGENCY MEDICINE

## 2024-10-09 PROCEDURE — 87040 BLOOD CULTURE FOR BACTERIA: CPT | Performed by: EMERGENCY MEDICINE

## 2024-10-09 PROCEDURE — 99284 EMERGENCY DEPT VISIT MOD MDM: CPT | Mod: 25

## 2024-10-09 PROCEDURE — 96361 HYDRATE IV INFUSION ADD-ON: CPT

## 2024-10-09 PROCEDURE — 83605 ASSAY OF LACTIC ACID: CPT | Performed by: EMERGENCY MEDICINE

## 2024-10-09 PROCEDURE — 87637 SARSCOV2&INF A&B&RSV AMP PRB: CPT | Performed by: EMERGENCY MEDICINE

## 2024-10-09 PROCEDURE — 80053 COMPREHEN METABOLIC PANEL: CPT | Performed by: EMERGENCY MEDICINE

## 2024-10-09 PROCEDURE — 96374 THER/PROPH/DIAG INJ IV PUSH: CPT

## 2024-10-09 PROCEDURE — 250N000011 HC RX IP 250 OP 636: Performed by: EMERGENCY MEDICINE

## 2024-10-09 PROCEDURE — 258N000003 HC RX IP 258 OP 636: Performed by: EMERGENCY MEDICINE

## 2024-10-09 RX ORDER — POTASSIUM CHLORIDE 1500 MG/1
40 TABLET, EXTENDED RELEASE ORAL ONCE
Status: COMPLETED | OUTPATIENT
Start: 2024-10-09 | End: 2024-10-09

## 2024-10-09 RX ORDER — ONDANSETRON 4 MG/1
4 TABLET, ORALLY DISINTEGRATING ORAL EVERY 8 HOURS PRN
Qty: 10 TABLET | Refills: 0 | Status: SHIPPED | OUTPATIENT
Start: 2024-10-09 | End: 2024-10-12

## 2024-10-09 RX ORDER — KETOROLAC TROMETHAMINE 15 MG/ML
15 INJECTION, SOLUTION INTRAMUSCULAR; INTRAVENOUS ONCE
Status: COMPLETED | OUTPATIENT
Start: 2024-10-09 | End: 2024-10-09

## 2024-10-09 RX ORDER — METOCLOPRAMIDE HYDROCHLORIDE 5 MG/ML
10 INJECTION INTRAMUSCULAR; INTRAVENOUS ONCE
Status: COMPLETED | OUTPATIENT
Start: 2024-10-09 | End: 2024-10-09

## 2024-10-09 RX ADMIN — METOCLOPRAMIDE HYDROCHLORIDE 10 MG: 5 INJECTION INTRAMUSCULAR; INTRAVENOUS at 02:49

## 2024-10-09 RX ADMIN — SODIUM CHLORIDE 1000 ML: 9 INJECTION, SOLUTION INTRAVENOUS at 02:49

## 2024-10-09 RX ADMIN — KETOROLAC TROMETHAMINE 15 MG: 15 INJECTION, SOLUTION INTRAMUSCULAR; INTRAVENOUS at 02:50

## 2024-10-09 RX ADMIN — POTASSIUM CHLORIDE 40 MEQ: 1500 TABLET, EXTENDED RELEASE ORAL at 03:38

## 2024-10-09 ASSESSMENT — ACTIVITIES OF DAILY LIVING (ADL): ADLS_ACUITY_SCORE: 35

## 2024-10-09 ASSESSMENT — COLUMBIA-SUICIDE SEVERITY RATING SCALE - C-SSRS
6. HAVE YOU EVER DONE ANYTHING, STARTED TO DO ANYTHING, OR PREPARED TO DO ANYTHING TO END YOUR LIFE?: NO
1. IN THE PAST MONTH, HAVE YOU WISHED YOU WERE DEAD OR WISHED YOU COULD GO TO SLEEP AND NOT WAKE UP?: NO
2. HAVE YOU ACTUALLY HAD ANY THOUGHTS OF KILLING YOURSELF IN THE PAST MONTH?: NO

## 2024-10-09 NOTE — ED NOTES
Labs sent, urine sample collected and sent as well. IVF infusing, pt has stopped dry heaving and nausea has subsided following anti emetics.    VSS. Friend at bedside.

## 2024-10-09 NOTE — ED PROVIDER NOTES
EMERGENCY DEPARTMENT ENCOUNTER      NAME: Jennifer Daniels  AGE: 43 year old female  YOB: 1980  MRN: [id number]  EVALUATION DATE & TIME: 10/9/2024  2:07 AM    PCP: No Ref-Primary, Physician    ED PROVIDER: Vinny Tate M.D.      Chief Complaint   Patient presents with    Nausea & Vomiting         FINAL IMPRESSION:  1. Acute nonintractable headache, unspecified headache type    2. Nausea and vomiting, unspecified vomiting type    3. Hypokalemia          ED COURSE & MEDICAL DECISION MAKING:    Pertinent Labs & Imaging studies reviewed. (See chart for details)  43 year old female presents to the Emergency Department for evaluation of nausea vomiting and generally feeling poor.  Patient has a history of methamphetamine abuse.  Did begin a broad workup here looking for possible infectious etiologies.  COVID influenza and RSV are negative.  Urinalysis does not show infection.  White count is normal.  Lactic acid is normal.  Given IV fluids IV Reglan IV Toradol with improvement of symptoms.  May be migraine.  Does have a mildly low potassium which was replaced orally here.  I do not see any signs of infection.  Normal neurologic exam.  No signs of intracranial hemorrhage.  No indication for further testing.  Discussed findings with the patient.  I suspect this is mostly secondary to her drug use.  Will discharge her home    2:10 AM I met with the patient to gather history and to perform my initial exam. I discussed the plan for care while in the Emergency Department.       At the conclusion of the encounter I discussed the results of all of the tests and the disposition. The questions were answered. The patient or family acknowledged understanding and was agreeable with the care plan.     Medical Decision Making  Obtained supplemental history:Supplemental history obtained?: No  Reviewed external records: External records reviewed?: Documented in chart and Inpatient Record: Hospitalization for  pyelonephritis 8/28/2023  Care impacted by chronic illness:Alcohol and/or Drug Abuse or Dependence  Did you consider but not order tests?: Work up considered but not performed and documented in chart, if applicable  Did you interpret images independently?: Independent interpretation of ECG and images noted in documentation, when applicable.  Consultation discussion with other provider:Did you involve another provider (consultant, , pharmacy, etc.)?: No  Discharge. I prescribed additional prescription strength medication(s) as charted. See documentation for any additional details.    MIPS:            MEDICATIONS GIVEN IN THE EMERGENCY:  Medications   sodium chloride 0.9% BOLUS 1,000 mL (0 mLs Intravenous Stopped 10/9/24 0346)   metoclopramide (REGLAN) injection 10 mg (10 mg Intravenous $Given 10/9/24 0249)   ketorolac (TORADOL) injection 15 mg (15 mg Intravenous $Given 10/9/24 0250)   potassium chloride dian ER (KLOR-CON M20) CR tablet 40 mEq (40 mEq Oral $Given 10/9/24 5932)       NEW PRESCRIPTIONS STARTED AT TODAY'S ER VISIT  Discharge Medication List as of 10/9/2024  3:46 AM        START taking these medications    Details   ondansetron (ZOFRAN ODT) 4 MG ODT tab Take 1 tablet (4 mg) by mouth every 8 hours as needed for nausea., Disp-10 tablet, R-0, Local Print                =================================================================    HPI    Patient information was obtained from: Patient    Jennifer Daniels is a 43 year old female with a pertinent history of methamphetamine abuse, migraine headache, CVA, patent foramen ovale, pyelonephritis who presents to this ED for evaluation of fever headache and nausea.  States she started 2 days ago with difficulty urinating.  Has foul-smelling urine.  Also does some blood in it.  Now having some bilateral flank pain.  Tonight had headache and vomiting.  No weakness or numbness.  No visual change.  Does have a history of meth abuse.  Is unclear when she last used.   "She states she does not inject.  Did review electronic medical record has multiple visits for infectious etiologies and drug use.  Also has a hospitalization for pyelonephritis in 2023.        PAST MEDICAL HISTORY:  No past medical history on file.    PAST SURGICAL HISTORY:  Past Surgical History:   Procedure Laterality Date    Nephrolithiasis      REPAIR PATENT FORAMEN OVALE      TUBAL LIGATION             CURRENT MEDICATIONS:    No current facility-administered medications for this encounter.     Current Outpatient Medications   Medication Sig Dispense Refill    ondansetron (ZOFRAN ODT) 4 MG ODT tab Take 1 tablet (4 mg) by mouth every 8 hours as needed for nausea. 10 tablet 0    ibuprofen (ADVIL/MOTRIN) 600 MG tablet Take 1 tablet (600 mg) by mouth every 6 hours as needed for moderate pain 20 tablet 0         ALLERGIES:  Allergies   Allergen Reactions    Hydrocodone-Acetaminophen Nausea and Vomiting     Can take percocet       FAMILY HISTORY:  Family History   Problem Relation Age of Onset    Asthma Sister     Asthma Brother        SOCIAL HISTORY:   Social History     Socioeconomic History    Marital status: Single   Tobacco Use    Smoking status: Never   Substance and Sexual Activity    Alcohol use: Not Currently    Drug use: Yes     Frequency: 7.0 times per week     Types: Marijuana     Social Determinants of Health      Received from [employer] [employer] [facility]    Financial Resource Strain    [facility] [employer] [employer] [facility]    Social Connections       VITALS:  /64   Pulse 61   Temp 97.1  F (36.2  C) (Oral)   Resp 16   Ht 1.753 m (5' 9\")   Wt 59 kg (130 lb)   SpO2 100%   BMI 19.20 kg/m      PHYSICAL EXAM    Physical Exam  Vitals and nursing note reviewed.   Constitutional:       General: She is not in acute distress.     Appearance: She is not diaphoretic.   HENT: "      Head: Atraumatic.      Mouth/Throat:      Pharynx: No oropharyngeal exudate.   Eyes:      General: No scleral icterus.     Pupils: Pupils are equal, round, and reactive to light.   Cardiovascular:      Rate and Rhythm: Normal rate and regular rhythm.      Heart sounds: Normal heart sounds.   Pulmonary:      Effort: No respiratory distress.      Breath sounds: Normal breath sounds.   Abdominal:      Palpations: Abdomen is soft.      Tenderness: There is no abdominal tenderness. There is no guarding or rebound. Negative signs include Farr's sign.   Musculoskeletal:         General: No tenderness.   Skin:     General: Skin is warm.      Findings: No rash.   Neurological:      General: No focal deficit present.      Mental Status: She is alert.      Comments: 5 out of 5 strength in bilateral upper and lower extremities.  Sensation intact in all 4 extremes.  Cranial nerves intact.  No pronator drift               LAB:  All pertinent labs reviewed and interpreted.  Labs Ordered and Resulted from Time of ED Arrival to Time of ED Departure   COMPREHENSIVE METABOLIC PANEL - Abnormal       Result Value    Sodium 140      Potassium 3.0 (*)     Carbon Dioxide (CO2) 26      Anion Gap 12      Urea Nitrogen 9.1      Creatinine 0.77      GFR Estimate >90      Calcium 9.1      Chloride 102      Glucose 121 (*)     Alkaline Phosphatase 92      AST 75 (*)     ALT 64 (*)     Protein Total 7.7      Albumin 4.3      Bilirubin Total 0.5     ROUTINE UA WITH MICROSCOPIC REFLEX TO CULTURE - Abnormal    Color Urine Light Yellow      Appearance Urine Turbid (*)     Glucose Urine Negative      Bilirubin Urine Negative      Ketones Urine Negative      Specific Gravity Urine 1.014      Blood Urine Negative      pH Urine 7.0      Protein Albumin Urine Negative      Urobilinogen Urine <2.0      Nitrite Urine Negative      Leukocyte Esterase Urine Negative      Bacteria Urine Few (*)     Mucus Urine Present (*)     RBC Urine <1      WBC Urine  5      Squamous Epithelials Urine <1     CBC WITH PLATELETS AND DIFFERENTIAL - Abnormal    WBC Count 8.3      RBC Count 5.40 (*)     Hemoglobin 10.5 (*)     Hematocrit 37.0      MCV 69 (*)     MCH 19.4 (*)     MCHC 28.4 (*)     RDW 16.9 (*)     Platelet Count 341      % Neutrophils 64      % Lymphocytes 25      % Monocytes 7      % Eosinophils 3      % Basophils 1      % Immature Granulocytes 0      NRBCs per 100 WBC 0      Absolute Neutrophils 5.4      Absolute Lymphocytes 2.0      Absolute Monocytes 0.6      Absolute Eosinophils 0.3      Absolute Basophils 0.1      Absolute Immature Granulocytes 0.0      Absolute NRBCs 0.0     LACTIC ACID WHOLE BLOOD WITH 1X REPEAT IN 2 HR WHEN >2 - Normal    Lactic Acid, Initial 1.0     INFLUENZA A/B, RSV, & SARS-COV2 PCR - Normal    Influenza A PCR Negative      Influenza B PCR Negative      RSV PCR Negative      SARS CoV2 PCR Negative     BLOOD CULTURE       RADIOLOGY:  Reviewed all pertinent imaging. Please see official radiology report.  No orders to display           Vinny Tate M.D.  Emergency Medicine  [facility] EMERGENCY ROOM  [address] [address] [phone]  Dept: [phone]       Vinny Tate MD  10/09/24 0459

## 2024-10-09 NOTE — ED TRIAGE NOTES
Patient presents to the ED complaining of nausea, vomiting, headache, burning with urination and cloudy urine.  She states all symptoms started 20 minutes ago when she woke up.  Patient continually picking at the air on the left side of her ear, when asked what she is doing she said her left ear hurts.  Not making eye contact.       Triage Assessment (Adult)       Row Name 10/09/24 0203          Triage Assessment    Airway WDL WDL        Respiratory WDL    Respiratory WDL WDL        Skin Circulation/Temperature WDL    Skin Circulation/Temperature WDL WDL        Cardiac WDL    Cardiac WDL WDL        Peripheral/Neurovascular WDL    Peripheral Neurovascular WDL WDL        Cognitive/Neuro/Behavioral WDL    Cognitive/Neuro/Behavioral WDL WDL

## 2024-10-14 LAB — BACTERIA BLD CULT: NO GROWTH

## 2025-06-11 ENCOUNTER — HOSPITAL ENCOUNTER (EMERGENCY)
Facility: CLINIC | Age: 45
Discharge: HOME | End: 2025-06-11
Attending: EMERGENCY MEDICINE | Admitting: EMERGENCY MEDICINE
Payer: COMMERCIAL

## 2025-06-11 VITALS
TEMPERATURE: 98.5 F | OXYGEN SATURATION: 100 % | HEIGHT: 69 IN | BODY MASS INDEX: 22.22 KG/M2 | SYSTOLIC BLOOD PRESSURE: 132 MMHG | HEART RATE: 68 BPM | RESPIRATION RATE: 16 BRPM | WEIGHT: 150 LBS | DIASTOLIC BLOOD PRESSURE: 84 MMHG

## 2025-06-11 DIAGNOSIS — D64.9 ANEMIA, UNSPECIFIED TYPE: ICD-10-CM

## 2025-06-11 DIAGNOSIS — H60.12 CELLULITIS OF LEFT EAR: ICD-10-CM

## 2025-06-11 DIAGNOSIS — E87.6 HYPOKALEMIA: ICD-10-CM

## 2025-06-11 DIAGNOSIS — N10 PYELONEPHRITIS, ACUTE: ICD-10-CM

## 2025-06-11 LAB
ALBUMIN SERPL BCG-MCNC: 3.7 G/DL (ref 3.5–5.2)
ALBUMIN UR-MCNC: NEGATIVE MG/DL
ALP SERPL-CCNC: 84 U/L (ref 40–150)
ALT SERPL W P-5'-P-CCNC: 82 U/L (ref 0–50)
ANION GAP SERPL CALCULATED.3IONS-SCNC: 9 MMOL/L (ref 7–15)
APPEARANCE UR: ABNORMAL
AST SERPL W P-5'-P-CCNC: 110 U/L (ref 0–45)
BASOPHILS # BLD AUTO: 0 10E3/UL (ref 0–0.2)
BASOPHILS NFR BLD AUTO: 1 %
BILIRUB SERPL-MCNC: 0.5 MG/DL
BILIRUB UR QL STRIP: NEGATIVE
BUN SERPL-MCNC: 10.6 MG/DL (ref 6–20)
CALCIUM SERPL-MCNC: 8.6 MG/DL (ref 8.8–10.4)
CHLORIDE SERPL-SCNC: 104 MMOL/L (ref 98–107)
COLOR UR AUTO: ABNORMAL
CREAT SERPL-MCNC: 0.8 MG/DL (ref 0.51–0.95)
EGFRCR SERPLBLD CKD-EPI 2021: >90 ML/MIN/1.73M2
EOSINOPHIL # BLD AUTO: 0.3 10E3/UL (ref 0–0.7)
EOSINOPHIL NFR BLD AUTO: 3 %
ERYTHROCYTE [DISTWIDTH] IN BLOOD BY AUTOMATED COUNT: 17.2 % (ref 10–15)
GLUCOSE SERPL-MCNC: 101 MG/DL (ref 70–99)
GLUCOSE UR STRIP-MCNC: NEGATIVE MG/DL
HCO3 SERPL-SCNC: 27 MMOL/L (ref 22–29)
HCT VFR BLD AUTO: 31.6 % (ref 35–47)
HGB BLD-MCNC: 8.8 G/DL (ref 11.7–15.7)
HGB UR QL STRIP: ABNORMAL
IMM GRANULOCYTES # BLD: 0 10E3/UL
IMM GRANULOCYTES NFR BLD: 0 %
KETONES UR STRIP-MCNC: NEGATIVE MG/DL
LEUKOCYTE ESTERASE UR QL STRIP: ABNORMAL
LIPASE SERPL-CCNC: 35 U/L (ref 13–60)
LYMPHOCYTES # BLD AUTO: 1.7 10E3/UL (ref 0.8–5.3)
LYMPHOCYTES NFR BLD AUTO: 22 %
MCH RBC QN AUTO: 18.6 PG (ref 26.5–33)
MCHC RBC AUTO-ENTMCNC: 27.8 G/DL (ref 31.5–36.5)
MCV RBC AUTO: 67 FL (ref 78–100)
MONOCYTES # BLD AUTO: 0.7 10E3/UL (ref 0–1.3)
MONOCYTES NFR BLD AUTO: 10 %
MUCOUS THREADS #/AREA URNS LPF: PRESENT /LPF
NEUTROPHILS # BLD AUTO: 5 10E3/UL (ref 1.6–8.3)
NEUTROPHILS NFR BLD AUTO: 64 %
NITRATE UR QL: NEGATIVE
NRBC # BLD AUTO: 0 10E3/UL
NRBC BLD AUTO-RTO: 0 /100
PH UR STRIP: 6.5 [PH] (ref 5–7)
PLATELET # BLD AUTO: 321 10E3/UL (ref 150–450)
POTASSIUM SERPL-SCNC: 3 MMOL/L (ref 3.4–5.3)
PROT SERPL-MCNC: 6.4 G/DL (ref 6.4–8.3)
RBC # BLD AUTO: 4.73 10E6/UL (ref 3.8–5.2)
RBC URINE: 21 /HPF
SODIUM SERPL-SCNC: 140 MMOL/L (ref 135–145)
SP GR UR STRIP: 1.01 (ref 1–1.03)
SQUAMOUS EPITHELIAL: 2 /HPF
UROBILINOGEN UR STRIP-MCNC: NORMAL MG/DL
WBC # BLD AUTO: 7.7 10E3/UL (ref 4–11)
WBC URINE: 32 /HPF

## 2025-06-11 PROCEDURE — 96361 HYDRATE IV INFUSION ADD-ON: CPT

## 2025-06-11 PROCEDURE — 84132 ASSAY OF SERUM POTASSIUM: CPT | Performed by: EMERGENCY MEDICINE

## 2025-06-11 PROCEDURE — 99284 EMERGENCY DEPT VISIT MOD MDM: CPT | Mod: 25

## 2025-06-11 PROCEDURE — 250N000011 HC RX IP 250 OP 636: Performed by: EMERGENCY MEDICINE

## 2025-06-11 PROCEDURE — 85025 COMPLETE CBC W/AUTO DIFF WBC: CPT | Performed by: EMERGENCY MEDICINE

## 2025-06-11 PROCEDURE — 36415 COLL VENOUS BLD VENIPUNCTURE: CPT | Performed by: EMERGENCY MEDICINE

## 2025-06-11 PROCEDURE — 96375 TX/PRO/DX INJ NEW DRUG ADDON: CPT

## 2025-06-11 PROCEDURE — 83690 ASSAY OF LIPASE: CPT | Performed by: EMERGENCY MEDICINE

## 2025-06-11 PROCEDURE — 250N000013 HC RX MED GY IP 250 OP 250 PS 637: Performed by: EMERGENCY MEDICINE

## 2025-06-11 PROCEDURE — 81001 URINALYSIS AUTO W/SCOPE: CPT | Performed by: EMERGENCY MEDICINE

## 2025-06-11 PROCEDURE — 258N000003 HC RX IP 258 OP 636: Performed by: EMERGENCY MEDICINE

## 2025-06-11 PROCEDURE — 96365 THER/PROPH/DIAG IV INF INIT: CPT

## 2025-06-11 RX ORDER — OXYCODONE AND ACETAMINOPHEN 5; 325 MG/1; MG/1
1 TABLET ORAL EVERY 6 HOURS PRN
Qty: 12 TABLET | Refills: 0 | Status: SHIPPED | OUTPATIENT
Start: 2025-06-11 | End: 2025-06-14

## 2025-06-11 RX ORDER — CEPHALEXIN 500 MG/1
500 CAPSULE ORAL ONCE
Status: COMPLETED | OUTPATIENT
Start: 2025-06-11 | End: 2025-06-11

## 2025-06-11 RX ORDER — CEPHALEXIN 500 MG/1
500 CAPSULE ORAL 4 TIMES DAILY
Qty: 28 CAPSULE | Refills: 0 | Status: SHIPPED | OUTPATIENT
Start: 2025-06-11 | End: 2025-06-18

## 2025-06-11 RX ORDER — POTASSIUM CHLORIDE 20MEQ/15ML
40 LIQUID (ML) ORAL ONCE
Status: COMPLETED | OUTPATIENT
Start: 2025-06-11 | End: 2025-06-11

## 2025-06-11 RX ORDER — KETOROLAC TROMETHAMINE 15 MG/ML
15 INJECTION, SOLUTION INTRAMUSCULAR; INTRAVENOUS ONCE
Status: COMPLETED | OUTPATIENT
Start: 2025-06-11 | End: 2025-06-11

## 2025-06-11 RX ORDER — ONDANSETRON 2 MG/ML
4 INJECTION INTRAMUSCULAR; INTRAVENOUS ONCE
Status: COMPLETED | OUTPATIENT
Start: 2025-06-11 | End: 2025-06-11

## 2025-06-11 RX ORDER — CEFTRIAXONE SODIUM 1 G
1 VIAL (EA) INJECTION ONCE
Status: COMPLETED | OUTPATIENT
Start: 2025-06-11 | End: 2025-06-11

## 2025-06-11 RX ORDER — OXYCODONE AND ACETAMINOPHEN 5; 325 MG/1; MG/1
1 TABLET ORAL ONCE
Refills: 0 | Status: COMPLETED | OUTPATIENT
Start: 2025-06-11 | End: 2025-06-11

## 2025-06-11 RX ORDER — ONDANSETRON 4 MG/1
4 TABLET, ORALLY DISINTEGRATING ORAL EVERY 8 HOURS PRN
Qty: 10 TABLET | Refills: 0 | Status: SHIPPED | OUTPATIENT
Start: 2025-06-11 | End: 2025-06-14

## 2025-06-11 RX ORDER — CEFTRIAXONE 1 G/1
1 INJECTION, POWDER, FOR SOLUTION INTRAMUSCULAR; INTRAVENOUS ONCE
Status: COMPLETED | OUTPATIENT
Start: 2025-06-11 | End: 2025-06-11

## 2025-06-11 RX ADMIN — SODIUM CHLORIDE 1000 ML: 0.9 INJECTION, SOLUTION INTRAVENOUS at 15:54

## 2025-06-11 RX ADMIN — KETOROLAC TROMETHAMINE 15 MG: 15 INJECTION, SOLUTION INTRAMUSCULAR; INTRAVENOUS at 16:09

## 2025-06-11 RX ADMIN — POTASSIUM CHLORIDE 40 MEQ: 1.5 SOLUTION ORAL at 18:31

## 2025-06-11 RX ADMIN — ONDANSETRON 4 MG: 2 INJECTION, SOLUTION INTRAMUSCULAR; INTRAVENOUS at 16:11

## 2025-06-11 RX ADMIN — OXYCODONE AND ACETAMINOPHEN 1 TABLET: 5; 325 TABLET ORAL at 14:48

## 2025-06-11 RX ADMIN — CEFTRIAXONE 1 G: 1 INJECTION, POWDER, FOR SOLUTION INTRAMUSCULAR; INTRAVENOUS at 17:54

## 2025-06-11 RX ADMIN — CEPHALEXIN 500 MG: 500 CAPSULE ORAL at 19:14

## 2025-06-11 ASSESSMENT — ENCOUNTER SYMPTOMS
FEVER: 0
FLANK PAIN: 1
BACK PAIN: 1
FREQUENCY: 1
NAUSEA: 1

## 2025-06-11 ASSESSMENT — ACTIVITIES OF DAILY LIVING (ADL)
ADLS_ACUITY_SCORE: 41

## 2025-06-11 ASSESSMENT — COLUMBIA-SUICIDE SEVERITY RATING SCALE - C-SSRS
6. HAVE YOU EVER DONE ANYTHING, STARTED TO DO ANYTHING, OR PREPARED TO DO ANYTHING TO END YOUR LIFE?: YES
2. HAVE YOU ACTUALLY HAD ANY THOUGHTS OF KILLING YOURSELF IN THE PAST MONTH?: NO
1. IN THE PAST MONTH, HAVE YOU WISHED YOU WERE DEAD OR WISHED YOU COULD GO TO SLEEP AND NOT WAKE UP?: NO

## 2025-06-11 NOTE — DISCHARGE INSTRUCTIONS
Your symptoms are concerning for early kidney infection.  There may also be a mild infection of your ear.  Antibiotics to cover for both.  Your urinalysis does have concern for infection as well there is a urine culture pending.  You have received a dose of antibiotics.  In addition, your potassium was very mildly depressed we have replaced that in the emergency department.  I recommend close follow-up with your primary care doctor.  Please call for appointment on Friday for recheck and in follow-up to your urine culture results which will be hopefully returned at that time.  Take antibiotics as prescribed.  If you have any escalation your symptoms please return to the emergency department for repeat assessment.  Lastly, your hemoglobin has drifted downward again.  It looks like you suffer from chronic anemia.  This will also be important to address with your primary care doctor on Friday.  Continue your current medications.  Recommend over-the-counter iron supplementation.

## 2025-06-11 NOTE — ED TRIAGE NOTES
PT complains of bilateral flank pain, urinary urgency, and left ear pain. States these sx have been going on for four weeks.      Triage Assessment (Adult)       Row Name 06/11/25 9925          Triage Assessment    Airway WDL WDL        Respiratory WDL    Respiratory WDL WDL        Skin Circulation/Temperature WDL    Skin Circulation/Temperature WDL WDL        Cardiac WDL    Cardiac WDL WDL        Peripheral/Neurovascular WDL    Peripheral Neurovascular WDL WDL        Cognitive/Neuro/Behavioral WDL    Cognitive/Neuro/Behavioral WDL WDL

## 2025-06-11 NOTE — ED PROVIDER NOTES
EMERGENCY DEPARTMENT ENCOUNTER      NAME: Jennifer Daniels  AGE: 44 year old female  YOB: 1980  MRN: [id number]  EVALUATION DATE & TIME: No admission date for patient encounter.    PCP: No Ref-Primary, Physician    ED PROVIDER: Bert Ocampo MD    Chief Complaint   Patient presents with    Flank Pain    Otalgia     FINAL IMPRESSION:  1. Pyelonephritis, acute    2. Hypokalemia      ED COURSE & MEDICAL DECISION MAKIN:28 PM I met with the patient to obtain patient history and performed a physical exam. Discussed plan for ED work up including potential diagnostic studies and interventions.      Pertinent Labs & Imaging studies reviewed. (See chart for details)  44 year old female presents to the Emergency Department for evaluation of flank pain and urinary symptoms.  Patient concerned she has pyelonephritis.  Has a history of frequent episodes of pyelonephritis.  Reports escalating bilateral flank pain.  Urinary symptoms.  Also has pain to her left ear.  She has had a chronic condition involving that left ear that she has been evaluated for in the past on outpatient basis.  Today she noted that was feeling red and painful.  On examination patient was noted to be mildly hypertensive.  Vital signs otherwise unremarkable.  She had pain to both flanks by history not clearly reproducible on examination.  No abdominal discomfort anteriorly.  Appeared well-hydrated.  No other concerning exam findings relative to the concerns for possible pyelonephritis.  In terms of the left ear there was an area of erythema just    5:52 PM  Patient responded well to treatment in the emergency department.  Laboratory testing was notable for a normal white blood cell count.  Patient does have a microcytic anemia.  She has a history of chronic anemia.  Hemoglobin is typically fluctuating from 8-10.  No history to suggest active bleeding.  Abdominal examination is completely benign on serial examinations.  She does have  the flank pain noted above.  Potassium was 3.0.  Kidney function was normal.  No fever here in the emergency department.  Urinalysis did demonstrate concern for infection there is a culture pending.  The symptoms per patient report are typical of her pyelonephritis infections of past.  In that sense I think antibiotics are indicated with plan for ceftriaxone while we follow the urine culture results.  She was replaced in terms of potassium.  Overall I think patient is appropriate for discharge and outpatient management of her pyelonephritis.  She is tolerating oral intake.  Feels improved.  Will plan on discharge oral antibiotics oral antiemetics pain control follow-up in 2 days for repeat examination.  Explained all this to the patient.  Reviewed all of her test results and discussed return precautions prior to discharge.  Patient is comfortable this plan of care.  6:06 PM  Addendum: I also discussed with the patient the erythema to her ear which could be dermatitis mediated versus a very mild cellulitis of the tragus.  Patient is going to be discharged on antibiotics and should have coverage with recommendations for again follow-up on Friday for recheck  7:01 PM  Unfortunately IV infiltrated at the start of the ceftriaxone infusion.  I recommended the patient that we do IM ceftriaxone instead which patient refused we will administer oral antibiotic she is tolerating oral intake at this point and plan for discharge from there       Medical Decision Making  Discharge. I prescribed additional prescription strength medication(s) as charted. I considered admission, but discharged the patient after share decision making conversation.    MIPS (CTPE, Dental pain, Siegel, Sinusitis, Asthma/COPD, Head Trauma): Not Applicable    SEPSIS: None        At the conclusion of the encounter I discussed the results of all of the tests and the disposition. The questions were answered. The patient or family acknowledged understanding  "and was agreeable with the care plan.       MEDICATIONS GIVEN IN THE EMERGENCY:  Medications   cefTRIAXone (ROCEPHIN) 1 g vial to attach to  mL bag for ADULTS or NS 50 mL bag for PEDS (has no administration in time range)   potassium chloride (KAYCIEL) solution 40 mEq (has no administration in time range)   sodium chloride 0.9% BOLUS 1,000 mL (1,000 mLs Intravenous $New Bag 6/11/25 1559)   ketorolac (TORADOL) injection 15 mg (15 mg Intravenous $Given 6/11/25 1609)   ondansetron (ZOFRAN) injection 4 mg (4 mg Intravenous $Given 6/11/25 1611)   oxyCODONE-acetaminophen (PERCOCET) 5-325 MG per tablet 1 tablet (1 tablet Oral $Given 6/11/25 1448)       NEW PRESCRIPTIONS STARTED AT TODAY'S ER VISIT  New Prescriptions    CEPHALEXIN (KEFLEX) 500 MG CAPSULE    Take 1 capsule (500 mg) by mouth 4 times daily for 7 days.    ONDANSETRON (ZOFRAN ODT) 4 MG ODT TAB    Take 1 tablet (4 mg) by mouth every 8 hours as needed.    OXYCODONE-ACETAMINOPHEN (PERCOCET) 5-325 MG TABLET    Take 1 tablet by mouth every 6 hours as needed.     Modified Medications    No medications on file       =================================================================    HPI    Patient information was obtained from: patient     Use of : N/A         Jennifer Daniels is a 44 year old female with a pertinent history of nephrolithiasis, long term use of anticoagulants, UTI, CVA, pyelonephritis,  who presents to this ED by personal vehicle for evaluation of flank pain, urinary urgency, and ear pain.     Patient presents to the ED for concerns of bilateral flank pain and urinary urgency. He has a history of pyelonephritis and she had this the last time she was here. She gets pyelonephritis frequently and she is not sure why. She endorses nausea. She would like pain medications. Her flank/ back pain is normally bilateral with her previous infections. She denies antibiotic allergies but states that \"Vicodin makes her sick\". She denies chance of " pregnancy and believes that she has a tubal ligation.    She also endorses left ear pain that has been ongoing for 1 year but became worse this morning. She states that this morning the ear was red and swollen. It is not red at baseline. Her pain is on the outside of her ear. She states that she has a polyp there and it drains sometimes.     Patient denies any fever. Patient states no other complaints or concerns at this time.      Per Chart Review:   8/28/23-8/29/23, [facility] Emergency Room: Patient presented for evaluation of flank pain, increasing malaise and nausea vomiting inability to take oral intake. Patient has a past medical history significant for recurrent obstructing ureteral stones as well as recurrent pyelonephritis. Urinalysis was consistent with acute pyelonephritis. She was recently discharged culture pending on Bactrim antibiotic. Unfortunately the patient's urine culture grew out E. coli resistant to Bactrim. Patient antibiotic has not been changed. On examination patient appeared to be fatigued. She reported severe left flank pain. Reported nausea. Vital signs overall reassuring. Left flank pain to palpation no guarding no peritoneal signs. IV established. Did obtain blood cultures secondary to patient's reported history of sepsis and what appears to be several day history of pyelonephritis not currently on the appropriate antibiotic given its resistance pattern. Fortunately patient's laboratory testing overall was reassuring. Her CT results did demonstrate pyelonephritis Patient was admitted.     REVIEW OF SYSTEMS   Review of Systems   Constitutional:  Negative for fever.   HENT:  Positive for ear pain.    Gastrointestinal:  Positive for nausea.   Genitourinary:  Positive for flank pain and frequency.   Musculoskeletal:  Positive for back pain.   All other systems reviewed and are negative.      PHYSICAL EXAM    BP (!) 149/83   Pulse 66   Temp 98.5  F (36.9  " C) (Oral)   Resp 17   Ht 1.753 m (5' 9\")   Wt 68 kg (150 lb)   LMP 06/08/2025   SpO2 100%   BMI 22.15 kg/m    PHYSICAL EXAM    Constitutional: Well developed, Well nourished, NAD  HENT: Normocephalic, Atraumatic, erythema to left anterior ear tragus  Eyes: PERRL, EOMI, Conjunctiva normal, No discharge.   Respiratory: Normal breath sounds, No respiratory distress, No wheezing, Speaks full sentences easily. No cough.   Cardiovascular: Normal heart rate, Regular rhythm, No murmurs Chest wall nontender.    GI:  Soft, No tenderness, No masses, No flank tenderness. No rebound or guarding.  : No cva tenderness    Musculoskeletal: 2+ DP pulses. No edema. No cyanosis. Good range of motion in all major joints. No tenderness to palpation. No tenderness of the CTLS spine.   Integument: Warm, Dry, No erythema, No rash. No petechiae.   Neurologic: Alert & oriented x 3, Normal motor function, Normal sensory function, No focal deficits noted.   Psychiatric: Affect normal, Judgment normal, Mood normal. Cooperative.     LAB:  All pertinent labs reviewed and interpreted.  Results for orders placed or performed during the hospital encounter of 06/11/25   Comprehensive metabolic panel   Result Value Ref Range    Sodium 140 135 - 145 mmol/L    Potassium 3.0 (L) 3.4 - 5.3 mmol/L    Carbon Dioxide (CO2) 27 22 - 29 mmol/L    Anion Gap 9 7 - 15 mmol/L    Urea Nitrogen 10.6 6.0 - 20.0 mg/dL    Creatinine 0.80 0.51 - 0.95 mg/dL    GFR Estimate >90 >60 mL/min/1.73m2    Calcium 8.6 (L) 8.8 - 10.4 mg/dL    Chloride 104 98 - 107 mmol/L    Glucose 101 (H) 70 - 99 mg/dL    Alkaline Phosphatase 84 40 - 150 U/L     (H) 0 - 45 U/L    ALT 82 (H) 0 - 50 U/L    Protein Total 6.4 6.4 - 8.3 g/dL    Albumin 3.7 3.5 - 5.2 g/dL    Bilirubin Total 0.5 <=1.2 mg/dL   Result Value Ref Range    Lipase 35 13 - 60 U/L   UA with Microscopic reflex to Culture    Specimen: Urine, Clean Catch   Result Value Ref Range    Color Urine Light Yellow Colorless, " Straw, Light Yellow, Yellow    Appearance Urine Turbid (A) Clear    Glucose Urine Negative Negative mg/dL    Bilirubin Urine Negative Negative    Ketones Urine Negative Negative mg/dL    Specific Gravity Urine 1.010 1.001 - 1.030    Blood Urine 1.0 mg/dL (A) Negative    pH Urine 6.5 5.0 - 7.0    Protein Albumin Urine Negative Negative mg/dL    Urobilinogen Urine Normal Normal mg/dL    Nitrite Urine Negative Negative    Leukocyte Esterase Urine 250 Yomaira/uL (A) Negative    Mucus Urine Present (A) None Seen /LPF    RBC Urine 21 (H) <=2 /HPF    WBC Urine 32 (H) <=5 /HPF    Squamous Epithelials Urine 2 (H) <=1 /HPF   CBC with platelets and differential   Result Value Ref Range    WBC Count 7.7 4.0 - 11.0 10e3/uL    RBC Count 4.73 3.80 - 5.20 10e6/uL    Hemoglobin 8.8 (L) 11.7 - 15.7 g/dL    Hematocrit 31.6 (L) 35.0 - 47.0 %    MCV 67 (L) 78 - 100 fL    MCH 18.6 (L) 26.5 - 33.0 pg    MCHC 27.8 (L) 31.5 - 36.5 g/dL    RDW 17.2 (H) 10.0 - 15.0 %    Platelet Count 321 150 - 450 10e3/uL    % Neutrophils 64 %    % Lymphocytes 22 %    % Monocytes 10 %    % Eosinophils 3 %    % Basophils 1 %    % Immature Granulocytes 0 %    NRBCs per 100 WBC 0 <1 /100    Absolute Neutrophils 5.0 1.6 - 8.3 10e3/uL    Absolute Lymphocytes 1.7 0.8 - 5.3 10e3/uL    Absolute Monocytes 0.7 0.0 - 1.3 10e3/uL    Absolute Eosinophils 0.3 0.0 - 0.7 10e3/uL    Absolute Basophils 0.0 0.0 - 0.2 10e3/uL    Absolute Immature Granulocytes 0.0 <=0.4 10e3/uL    Absolute NRBCs 0.0 10e3/uL       RADIOLOGY:  Reviewed all pertinent imaging. Please see official radiology report.  No orders to display          Kev TRIANA, am serving as a scribe to document services personally performed by Dr. Bert Ocampo, based on my observations and the provider's statements to me. I, Dr. Bert Ocampo, attest that Kev Garcia is acting in a scribe capacity, has observed my performance of the services and has documented them in accordance with my direction.    Bert QUEZADA  MD [place]  [facility] EMERGENCY ROOM  [address] [address]  [phone]       Bert Ocampo MD  06/11/25 1755       Bert Ocampo MD  06/11/25 1805       Bert Ocampo MD  06/11/25 1908

## 2025-06-12 LAB — BACTERIA UR CULT: ABNORMAL
